# Patient Record
Sex: FEMALE | Race: WHITE | Employment: PART TIME | URBAN - METROPOLITAN AREA
[De-identification: names, ages, dates, MRNs, and addresses within clinical notes are randomized per-mention and may not be internally consistent; named-entity substitution may affect disease eponyms.]

---

## 2021-11-03 NOTE — PROGRESS NOTES
Elisa Ty is a 55 y.o. female is seen on 2021 for Establish Care and Medication Refill    Assessment & Plan:     1. Mild depression ([address]  Assessment & Plan:  Stable on regimen, continue  Interested in weaning down to 20 mg, will let me know when she is ready  Orders:  -     METABOLIC PANEL, COMPREHENSIVE; Future  -     CBC WITH AUTOMATED DIFF; Future  -     TSH 3RD GENERATION; Future  -     FLUoxetine (PROzac) 40 mg capsule; Take 1 Capsule by mouth daily. Indications: major depressive disorder, Normal, Disp-90 Capsule, R-0  2. Screening for lipid disorders  -     LIPID PANEL; Future  3. Need for hepatitis C screening test  -     HEPATITIS C AB; Future  4. Encounter for screening mammogram for malignant neoplasm of breast  -     NEELAM MAMMO BI SCREENING INCL CAD; Future  5. Screen for colon cancer  Comments:  Discussed colonoscopy vs FIT vs Cologuard  Orders:  -     REFERRAL TO GASTROENTEROLOGY  6. Needs flu shot  Comments:  Patient to obtain proof of vaccination  7. Encounter to establish care    Follow-up and Dispositions    · Return in about 4 weeks (around 2021) for physical, lab results. Subjective:     HPI    Previous PCP: Physician from [place]  Reason for switching: relocation    Social Hx:  Occupation-   Household- lives with   ETOH use- 1 drink a week  Recreational drug use- none  Tobacco use- none    Gyn Hx:  Last PAP: 3 years ago, will schedule    Miscarriage: 0  : 0  Date of LMP: n/a  Hx of STDs: none  Birth Control: n/a  Menopause: Yes  Hysterectomy: No  Last mammogram- 3 years ago, will order  Has hx of breast reduction in ?   She states that every time she goes for mammogram, she always has to go back for a second    Family Hx:  HTN- none  Diabetes- none  HLD- none  MI- none  Stroke - none  Cancer- PGF (brain?), father (non-alcoholic fatty liver)  Mental Health Disorder- parents, grandparents (depression)  Autoimmune Disease- none    Preventive:  COVID-19 vac - card scanned today  Flu vaccine- already received at [facility] vaccine[facility] [place]   Tetanus vaccine- unsure  Last Colonoscopy- never   Hep  C screening - ordered  MyChart activation - sent via email    Health Concerns:    Depression-  Patient is seen for followup of depression. Ongoing symptoms include:  see PHQ-9  Denies DANYELL  Has been on this medication for about 30 years  Medication has been working well for her  She is thinking about reducing the dose at some point in time  Key Psychotherapeutic Meds             FLUoxetine (PROzac) 40 mg capsule (Taking) Take 1 Capsule by mouth daily. Indications: major depressive disorder        3 most recent PHQ Screens 11/4/2021   Little interest or pleasure in doing things Not at all   Feeling down, depressed, irritable, or hopeless Several days   Total Score PHQ 2 1   Trouble falling or staying asleep, or sleeping too much Several days   Feeling tired or having little energy Several days   Poor appetite, weight loss, or overeating Nearly every day   Feeling bad about yourself - or that you are a failure or have let yourself or your family down Not at all   Trouble concentrating on things such as school, work, reading, or watching TV Not at all   Moving or speaking so slowly that other people could have noticed; or the opposite being so fidgety that others notice Not at all   Thoughts of being better off dead, or hurting yourself in some way Not at all   PHQ 9 Score 6     Review of Systems   Constitutional: Negative for chills, fever and malaise/fatigue. Respiratory: Negative for shortness of breath. Cardiovascular: Negative for chest pain. Psychiatric/Behavioral: Positive for depression. Negative for suicidal ideas.      Objective:   /81 (BP 1 Location: Left upper arm, BP Patient Position: Sitting, BP Cuff Size: Adult)   Pulse 71   Temp 98.3 °F (36.8 °C) (Oral)   Resp 17   Ht 5' 5\" (1.651 m)   Wt 220 lb (99.8 kg)   SpO2 95%   BMI 36.61 kg/m²     Physical Exam  Vitals and nursing note reviewed. Constitutional:       General: She is not in acute distress. Appearance: She is not ill-appearing. HENT:      Head: Normocephalic and atraumatic. Cardiovascular:      Rate and Rhythm: Normal rate and regular rhythm. Heart sounds: No murmur heard. No friction rub. No gallop. Pulmonary:      Effort: Pulmonary effort is normal. No respiratory distress. Breath sounds: No wheezing, rhonchi or rales. Skin:     General: Skin is warm and dry. Neurological:      General: No focal deficit present. Mental Status: She is alert and oriented to person, place, and time. Psychiatric:         Mood and Affect: Mood normal.         Thought Content:  Thought content normal.         Judgment: Judgment normal.        Nel Bello NP

## 2021-11-04 ENCOUNTER — OFFICE VISIT (OUTPATIENT)
Dept: FAMILY MEDICINE CLINIC | Age: 56
End: 2021-11-04
Payer: COMMERCIAL

## 2021-11-04 VITALS
HEIGHT: 65 IN | BODY MASS INDEX: 36.65 KG/M2 | DIASTOLIC BLOOD PRESSURE: 81 MMHG | HEART RATE: 71 BPM | SYSTOLIC BLOOD PRESSURE: 116 MMHG | OXYGEN SATURATION: 95 % | RESPIRATION RATE: 17 BRPM | WEIGHT: 220 LBS | TEMPERATURE: 98.3 F

## 2021-11-04 DIAGNOSIS — Z12.11 SCREEN FOR COLON CANCER: ICD-10-CM

## 2021-11-04 DIAGNOSIS — Z23 NEEDS FLU SHOT: ICD-10-CM

## 2021-11-04 DIAGNOSIS — Z11.59 NEED FOR HEPATITIS C SCREENING TEST: ICD-10-CM

## 2021-11-04 DIAGNOSIS — F32.A MILD DEPRESSION: Primary | ICD-10-CM

## 2021-11-04 DIAGNOSIS — Z13.220 SCREENING FOR LIPID DISORDERS: ICD-10-CM

## 2021-11-04 DIAGNOSIS — Z76.89 ENCOUNTER TO ESTABLISH CARE: ICD-10-CM

## 2021-11-04 DIAGNOSIS — Z12.31 ENCOUNTER FOR SCREENING MAMMOGRAM FOR MALIGNANT NEOPLASM OF BREAST: ICD-10-CM

## 2021-11-04 PROCEDURE — 99204 OFFICE O/P NEW MOD 45 MIN: CPT | Performed by: NURSE PRACTITIONER

## 2021-11-04 RX ORDER — FLUOXETINE HYDROCHLORIDE 40 MG/1
40 CAPSULE ORAL DAILY
COMMUNITY
End: 2021-11-04 | Stop reason: SDUPTHER

## 2021-11-04 RX ORDER — FLUOXETINE HYDROCHLORIDE 40 MG/1
40 CAPSULE ORAL DAILY
Qty: 90 CAPSULE | Refills: 0 | Status: SHIPPED | OUTPATIENT
Start: 2021-11-04 | End: 2022-01-10

## 2021-11-04 NOTE — ASSESSMENT & PLAN NOTE
Stable on regimen, continue  Interested in weaning down to 20 mg, will let me know when she is ready

## 2021-11-04 NOTE — PROGRESS NOTES
Magui Ramirez presents today for   Chief Complaint   Patient presents with   [facility] Care    Medication Refill       Is someone accompanying this pt? No     Is the patient using any DME equipment during [address]? no    Depression Screening:  3 most recent PHQ Screens 11/4/2021   Little interest or pleasure in doing things Not at all   Feeling down, depressed, irritable, or hopeless Several days   Total Score PHQ 2 1   Trouble falling or staying asleep, or sleeping too much Several days   Feeling tired or having little energy Several days   Poor appetite, weight loss, or overeating Nearly every day   Feeling bad about yourself - or that you are a failure or have let yourself or your family down Not at all   Trouble concentrating on things such as school, work, reading, or watching TV Not at all   Moving or speaking so slowly that other people could have noticed; or the opposite being so fidgety that others notice Not at all   Thoughts of being better off dead, or hurting yourself in some way Not at all   PHQ 9 Score 6       Learning Assessment:  Learning Assessment 11/4/2021   PRIMARY LEARNER Patient   HIGHEST LEVEL OF EDUCATION - PRIMARY LEARNER  4 YEARS [address] CAREGIVER No   PRIMARY LANGUAGE ENGLISH   LEARNER PREFERENCE PRIMARY READING   ANSWERED BY patient    RELATIONSHIP SELF       Fall Risk  No flowsheet data found. ADL  No flowsheet data found. Travel Screening:    Travel Screening     Question   Response    In the last month, have you been in contact with someone who was confirmed or suspected to have Coronavirus / COVID-19? No / Unsure    Have you had a COVID-19 viral test in the last 14 days? No    Do you have any of the following new or worsening symptoms? Have you traveled internationally or domestically in the last month?   No      Travel History   Travel since 10/04/21    No documented travel since 10/04/21         [facility] Maintenance reviewed and discussed and ordered per Provider. Health Maintenance Due   Topic Date Due    Hepatitis C Screening  Never done    COVID-19 Vaccine (1) Never done    DTaP/Tdap/Td series (1 - Tdap) Never done    Cervical cancer screen  Never done    Lipid Screen  Never done    Colorectal Cancer Screening Combo  Never done    Shingrix Vaccine Age 50> (1 of 2) Never done    Breast Cancer Screen Mammogram  Never done    Flu Vaccine (1) Never done   . Coordination of Care:  1. Have you been to the ER, urgent care clinic since your last visit? Hospitalized since your last visit? no    2. Have you seen or consulted any other health care providers outside of the [address] [place] [address] since your last visit? Include any pap smears or colon screening.  no

## 2021-11-04 NOTE — PATIENT INSTRUCTIONS
Recovering From Depression: Care Instructions  Your Care Instructions     Taking good care of yourself is important as you recover from depression. In time, your symptoms will fade as your treatment takes hold. Do not give up. Instead, focus your energy on getting better. Your mood will improve. It just takes some time. Focus on things that can help you feel better, such as being with friends and family, eating well, and getting enough rest. But take things slowly. Do not do too much too soon. You will begin to feel better gradually. Follow-up care is a key part of your treatment and safety. Be sure to make and go to all appointments, and call your doctor if you are having problems. It's also a good idea to know your test results and keep a list of the medicines you take. How can you care for yourself at home? Be realistic  · If you have a large task to do, break it up into smaller steps you can handle, and just do what you can. · You may want to put off important decisions until your depression has lifted. If you have plans that will have a major impact on your life, such as marriage, divorce, or a job change, try to wait a bit. Talk it over with friends and loved ones who can help you look at the overall picture first.  · Reaching out to people for help is important. Do not isolate yourself. Let your family and friends help you. Find someone you can trust and confide in, and talk to that person. · Be patient, and be kind to yourself. Remember that depression is not your fault and is not something you can overcome with willpower alone. Treatment is important for depression, just like for any other illness. Feeling better takes time, and your mood will improve little by little. Stay active  · Stay busy and get outside. Take a walk, or try some other light exercise. · Talk with your doctor about an exercise program. Exercise can help with mild depression. · Go to a movie or concert.  Take part in a Worship activity or other social gathering. Go to a [employer] game. · Ask a friend to have dinner with you. Take care of yourself  · Eat a balanced diet with plenty of fresh fruits and vegetables, whole grains, and lean protein. If you have lost your appetite, eat small snacks rather than large meals. · Avoid using illegal drugs or marijuana and drinking alcohol. Do not take medicines that have not been prescribed for you. They may interfere with medicines you may be taking for depression, or they may make your depression worse. · Take your medicines exactly as they are prescribed. You may start to feel better within 1 to 3 weeks of taking antidepressant medicine. But it can take as many as 6 to 8 weeks to see more improvement. If you have questions or concerns about your medicines, or if you do not notice any improvement by 3 weeks, talk to your doctor. · Continue to take your medicine after your symptoms improve. Taking your medicine for at least 6 months after you feel better can help keep you from getting depressed again. If this isn't the first time you have been depressed, your doctor may recommend you to take medicine even longer. · If you have any side effects from your medicine, tell your doctor. Many side effects are mild and will go away on their own after you have been taking the medicine for a few weeks. Some may last longer. Talk to your doctor if side effects are bothering you too much. You might be able to try a different medicine. · Continue counseling. It may help prevent depression from returning, especially if you've had multiple episodes of depression. Talk with your counselor if you are having a hard time attending your sessions or you think the sessions aren't working. Don't just stop going. · Get enough sleep. Talk to your doctor if you are having problems sleeping. · Avoid sleeping pills unless they are prescribed by the doctor treating your depression.  Sleeping pills may make you groggy during the day, and they may interact with other medicine you are taking. · If you have any other illnesses, such as diabetes, heart disease, or high blood pressure, make sure to continue with your treatment. Tell your doctor about all of the medicines you take, including those with or without a prescription. · If you or someone you know talks about suicide, self-harm, or feeling hopeless, get help right away. Call the [address] [place] [address] at 1-800-273-talk (0-618.926.7898) or text HOME to 340246 to access the Crisis Text Line. Consider saving these numbers in your phone. When should you call for help? Call 911 anytime you think you may need emergency care. For example, call if:    · You feel like hurting yourself or someone else.     · Someone you know has depression and is about to attempt or is attempting suicide. Call your doctor now or seek immediate medical care if:    · You hear voices.     · Someone you know has depression and:  ? Starts to give away his or her possessions. ? Uses illegal drugs or drinks alcohol heavily. ? Talks or writes about death, including writing suicide notes or talking about guns, knives, or pills. ? Starts to spend a lot of time alone. ? Acts very aggressively or suddenly appears calm. Watch closely for changes in your health, and be sure to contact your doctor if:    · You do not get better as expected. Where can you learn more? Go to http://www.gray.com/  Enter N529 in the search box to learn more about \"Recovering From Depression: Care Instructions. \"  Current as of: June 16, 2021               Content Version: 13.0  © 1966-9654 [employer] Incorporated. Care instructions adapted under license by [employer] (which disclaims liability or warranty for this information).  If you have questions about a medical condition or this instruction, always ask your healthcare professional. [address] any warranty or liability for your use of this information.

## 2021-11-29 ENCOUNTER — HOSPITAL ENCOUNTER (OUTPATIENT)
Dept: MAMMOGRAPHY | Age: 56
Discharge: HOME | End: 2021-11-29
Attending: NURSE PRACTITIONER
Payer: COMMERCIAL

## 2021-11-29 DIAGNOSIS — Z12.31 ENCOUNTER FOR SCREENING MAMMOGRAM FOR MALIGNANT NEOPLASM OF BREAST: ICD-10-CM

## 2021-11-29 PROCEDURE — 77063 BREAST TOMOSYNTHESIS BI: CPT

## 2021-12-02 ENCOUNTER — HOSPITAL ENCOUNTER (OUTPATIENT)
Dept: LAB | Age: 56
Discharge: HOME | End: 2021-12-02

## 2021-12-02 LAB — XX-LABCORP SPECIMEN COL,LCBCF: NORMAL

## 2021-12-02 PROCEDURE — 99001 SPECIMEN HANDLING PT-LAB: CPT

## 2021-12-03 LAB
ALBUMIN SERPL-MCNC: 4.4 G/DL (ref 3.8–4.9)
ALBUMIN/GLOB SERPL: 1.7 {RATIO} (ref 1.2–2.2)
ALP SERPL-CCNC: 72 IU/L (ref 44–121)
ALT SERPL-CCNC: 23 IU/L (ref 0–32)
AST SERPL-CCNC: 24 IU/L (ref 0–40)
BASOPHILS # BLD AUTO: 0.1 X10E3/UL (ref 0–0.2)
BASOPHILS NFR BLD AUTO: 1 %
BILIRUB SERPL-MCNC: 0.8 MG/DL (ref 0–1.2)
BUN SERPL-MCNC: 14 MG/DL (ref 6–24)
BUN/CREAT SERPL: 11 (ref 9–23)
CALCIUM SERPL-MCNC: 9.4 MG/DL (ref 8.7–10.2)
CHLORIDE SERPL-SCNC: 105 MMOL/L (ref 96–106)
CHOLEST SERPL-MCNC: 204 MG/DL (ref 100–199)
CO2 SERPL-SCNC: 22 MMOL/L (ref 20–29)
CREAT SERPL-MCNC: 1.3 MG/DL (ref 0.57–1)
EOSINOPHIL # BLD AUTO: 0.1 X10E3/UL (ref 0–0.4)
EOSINOPHIL NFR BLD AUTO: 2 %
ERYTHROCYTE [DISTWIDTH] IN BLOOD BY AUTOMATED COUNT: 12.5 % (ref 11.7–15.4)
GLOBULIN SER CALC-MCNC: 2.6 G/DL (ref 1.5–4.5)
GLUCOSE SERPL-MCNC: 103 MG/DL (ref 65–99)
HCT VFR BLD AUTO: 42.8 % (ref 34–46.6)
HCV AB S/CO SERPL IA: <0.1 S/CO RATIO (ref 0–0.9)
HDLC SERPL-MCNC: 39 MG/DL
HGB BLD-MCNC: 14.2 G/DL (ref 11.1–15.9)
IMM GRANULOCYTES # BLD AUTO: 0 X10E3/UL (ref 0–0.1)
IMM GRANULOCYTES NFR BLD AUTO: 0 %
IMP & REVIEW OF LAB RESULTS: NORMAL
INTERPRETATION: NORMAL
LDLC SERPL CALC-MCNC: 140 MG/DL (ref 0–99)
LYMPHOCYTES # BLD AUTO: 2.1 X10E3/UL (ref 0.7–3.1)
LYMPHOCYTES NFR BLD AUTO: 34 %
MCH RBC QN AUTO: 30.3 PG (ref 26.6–33)
MCHC RBC AUTO-ENTMCNC: 33.2 G/DL (ref 31.5–35.7)
MCV RBC AUTO: 91 FL (ref 79–97)
MONOCYTES # BLD AUTO: 0.5 X10E3/UL (ref 0.1–0.9)
MONOCYTES NFR BLD AUTO: 8 %
NEUTROPHILS # BLD AUTO: 3.3 X10E3/UL (ref 1.4–7)
NEUTROPHILS NFR BLD AUTO: 55 %
PLATELET # BLD AUTO: 259 X10E3/UL (ref 150–450)
POTASSIUM SERPL-SCNC: 4.4 MMOL/L (ref 3.5–5.2)
PROT SERPL-MCNC: 7 G/DL (ref 6–8.5)
RBC # BLD AUTO: 4.69 X10E6/UL (ref 3.77–5.28)
SODIUM SERPL-SCNC: 141 MMOL/L (ref 134–144)
TRIGL SERPL-MCNC: 136 MG/DL (ref 0–149)
TSH SERPL DL<=0.005 MIU/L-ACNC: 2.72 UIU/ML (ref 0.45–4.5)
VLDLC SERPL CALC-MCNC: 25 MG/DL (ref 5–40)
WBC # BLD AUTO: 6.2 X10E3/UL (ref 3.4–10.8)

## 2021-12-10 PROBLEM — R73.01 IFG (IMPAIRED FASTING GLUCOSE): Status: ACTIVE | Noted: 2021-12-10

## 2021-12-10 PROBLEM — R79.89 ELEVATED SERUM CREATININE: Status: ACTIVE | Noted: 2021-12-10

## 2021-12-10 PROBLEM — E78.5 HYPERLIPIDEMIA LDL GOAL <100: Status: ACTIVE | Noted: 2021-12-10

## 2021-12-10 PROBLEM — Z00.00 ANNUAL PHYSICAL EXAM: Status: ACTIVE | Noted: 2021-12-10

## 2021-12-10 NOTE — ASSESSMENT & PLAN NOTE
Physical activity for a total of 150 minutes per week is recommended, drink plenty of water. Reduce CHO intake, such as white pastas, white rice, white breads. Avoid fried foods, and eat more green, leafy vegetables, whole grains, and lean proteins.   Discussed recommended screenings and vaccines

## 2021-12-10 NOTE — ASSESSMENT & PLAN NOTE
LDL elevated, diet and exercise emphasized, handout given  Recheck in 3 mos  The 10-year ASCVD risk score (Soumya Kahn, et al., 2013) is: 2.1%

## 2021-12-10 NOTE — PROGRESS NOTES
Otis Pascual is a 55 y.o. female is seen on 12/13/2021 for Physical and Labs    Assessment & Plan:     1. Annual physical exam  Assessment & Plan:  Physical activity for a total of 150 minutes per week is recommended, drink plenty of water. Reduce CHO intake, such as white pastas, white rice, white breads. Avoid fried foods, and eat more green, leafy vegetables, whole grains, and lean proteins. Discussed recommended screenings and vaccines  2. Obesity (BMI 30-39. 9)  Assessment & Plan:  Diet and exercise advised  3. Hyperlipidemia LDL goal <100  Assessment & Plan:  LDL elevated, diet and exercise emphasized, handout given  Recheck in 3 mos  The 10-year ASCVD risk score (Arabella Hernandez, et al., 2013) is: 2.1%    Orders:  -     LIPID PANEL; Future  -     METABOLIC PANEL, COMPREHENSIVE; Future  4. IFG (impaired fasting glucose)  Assessment & Plan:  Diet and exercise emphasized  Add A1c to next set of labs    Orders:  -     METABOLIC PANEL, COMPREHENSIVE; Future  -     HEMOGLOBIN A1C WITH EAG; Future  5. Elevated serum creatinine  Assessment & Plan:  Avoid NSAIDs, hydration advised  Trend for now, repeat in 3 mos  Orders:  -     METABOLIC PANEL, COMPREHENSIVE; Future  6. Mild depression ([address]  Assessment & Plan:  Stable on regimen, continue  7. Encounter for immunization  -     TETANUS, DIPHTHERIA TOXOIDS AND ACELLULAR PERTUSSIS VACCINE (TDAP), IN INDIVIDS. >=7, IM  8.  Encounter to discuss test results     Follow-up and Dispositions    · Return in about 4 weeks (around 1/10/2022) for PAP AND  · Return in 3 mos for depression, cholesterol, elevated fasting blood sugar, lab results       Subjective:     HPI    Social Hx:  Occupation-   Household- lives with   ETOH use- 1 drink a week  Recreational drug use- none  Tobacco use- none    Family Hx:  HTN- none  Diabetes- none  HLD- none  MI- none  Stroke - none  Cancer- PGF (brain?), father (non-alcoholic fatty liver)  Mental Health Disorder- parents, grandparents (depression)  Autoimmune Disease- none    Preventive:  Diet: admits to poor eating d/t things happening at home (father  this past May and she moved)  Exercise: None, plans to obtain gym membership  Last eye exam- 1.5 years ago, will schedule  Last dental exam- 1.5 years ago, will schedule  Last PAP- 3 years ago, will schedule  COVID-19 vac - card scanned today  Flu vaccine- [facility] received at [facility]   [facility] [place]   Tetanus vaccine- unsure, will give today  Last Colonoscopy- previously referred, advised to schedule    Health Concerns:    Headaches/Migraines-  Onset: when she started menopause about 3 years ago  Location: back of the head  Duration: hours to 3 days  Characteristics: throbbing, pounding  Frequency: 1 in the last 2 mos  Associated symptoms: none  Aggravating factors: none  Relieving factors: medications  Treatment: Sumatriptan 50 mg, excedrin migraine  Pain Rating: none    Review of Systems   Constitutional: Negative for chills, fever, malaise/fatigue and weight loss. HENT: Negative for congestion, ear pain and sore throat. Eyes: Negative for blurred vision, pain and discharge. Respiratory: Negative for cough and shortness of breath. Cardiovascular: Negative for chest pain, palpitations, orthopnea and leg swelling. Gastrointestinal: Negative for abdominal pain, blood in stool, diarrhea, nausea and vomiting. Genitourinary: Negative for dysuria, frequency, hematuria and urgency. Musculoskeletal: Negative for back pain, joint pain and myalgias. Skin: Negative for rash. Neurological: Negative for dizziness, tingling, weakness and headaches. Endo/Heme/Allergies: Does not bruise/bleed easily. Psychiatric/Behavioral: Negative for depression. The patient is not nervous/anxious and does not have insomnia.       Objective:   /77 (BP 1 Location: Left upper arm, BP Patient Position: Sitting, BP Cuff Size: Adult)   Pulse (!) 106   Temp 98.2 °F (36.8 °C) (Oral)   Resp 18   Ht 5' 5\" (1.651 m)   Wt 218 lb (98.9 kg)   SpO2 98%   BMI 36.28 kg/m²    Visual Acuity Screening    Right eye Left eye Both eyes   Without correction:      With correction: 20/25 20/20 20/15       Physical Exam  Vitals and nursing note reviewed. Constitutional:       General: She is not in acute distress. Appearance: Normal appearance. She is not ill-appearing or toxic-appearing. HENT:      Head: Normocephalic and atraumatic. Right Ear: Tympanic membrane, ear canal and external ear normal.      Left Ear: Tympanic membrane, ear canal and external ear normal.      Mouth/Throat:      Mouth: Mucous membranes are moist.      Pharynx: Oropharynx is clear. No oropharyngeal exudate or posterior oropharyngeal erythema. Eyes:      Extraocular Movements: Extraocular movements intact. Conjunctiva/sclera: Conjunctivae normal.      Pupils: Pupils are equal, round, and reactive to light. Neck:      Vascular: No carotid bruit. Cardiovascular:      Rate and Rhythm: Regular rhythm. Tachycardia present. Pulses: Normal pulses. Heart sounds: Normal heart sounds. No murmur heard. No friction rub. No gallop. Pulmonary:      Effort: Pulmonary effort is normal. No respiratory distress. Breath sounds: No wheezing, rhonchi or rales. Abdominal:      General: Abdomen is flat. Bowel sounds are normal. There is no distension. Palpations: Abdomen is soft. There is no mass. Tenderness: There is no abdominal tenderness. There is no right CVA tenderness, left CVA tenderness, guarding or rebound. Musculoskeletal:         General: No tenderness or deformity. Normal range of motion. Cervical back: Normal range of motion and neck supple. Lymphadenopathy:      Cervical: No cervical adenopathy. Skin:     General: Skin is warm and dry. Capillary Refill: Capillary refill takes less than 2 seconds. Neurological:      General: No focal deficit present. Mental Status: She is alert and oriented to person, place, and time. Sensory: No sensory deficit. Motor: No weakness. Coordination: Coordination normal.   Psychiatric:         Mood and Affect: Mood normal.         Thought Content:  Thought content normal.         Judgment: Judgment normal.        CHUY Estrada-C

## 2021-12-13 ENCOUNTER — OFFICE VISIT (OUTPATIENT)
Dept: FAMILY MEDICINE CLINIC | Age: 56
End: 2021-12-13
Payer: COMMERCIAL

## 2021-12-13 VITALS
BODY MASS INDEX: 36.32 KG/M2 | WEIGHT: 218 LBS | SYSTOLIC BLOOD PRESSURE: 109 MMHG | TEMPERATURE: 98.2 F | HEIGHT: 65 IN | RESPIRATION RATE: 18 BRPM | OXYGEN SATURATION: 98 % | HEART RATE: 106 BPM | DIASTOLIC BLOOD PRESSURE: 77 MMHG

## 2021-12-13 DIAGNOSIS — E78.5 HYPERLIPIDEMIA LDL GOAL <100: ICD-10-CM

## 2021-12-13 DIAGNOSIS — F32.A MILD DEPRESSION: ICD-10-CM

## 2021-12-13 DIAGNOSIS — Z71.2 ENCOUNTER TO DISCUSS TEST RESULTS: ICD-10-CM

## 2021-12-13 DIAGNOSIS — Z23 ENCOUNTER FOR IMMUNIZATION: ICD-10-CM

## 2021-12-13 DIAGNOSIS — R79.89 ELEVATED SERUM CREATININE: ICD-10-CM

## 2021-12-13 DIAGNOSIS — R73.01 IFG (IMPAIRED FASTING GLUCOSE): ICD-10-CM

## 2021-12-13 DIAGNOSIS — E66.9 OBESITY (BMI 30-39.9): ICD-10-CM

## 2021-12-13 DIAGNOSIS — Z00.00 ANNUAL PHYSICAL EXAM: Primary | ICD-10-CM

## 2021-12-13 PROCEDURE — 99396 PREV VISIT EST AGE 40-64: CPT | Performed by: NURSE PRACTITIONER

## 2021-12-13 PROCEDURE — 90471 IMMUNIZATION ADMIN: CPT | Performed by: NURSE PRACTITIONER

## 2021-12-13 PROCEDURE — 90715 TDAP VACCINE 7 YRS/> IM: CPT | Performed by: NURSE PRACTITIONER

## 2021-12-13 RX ORDER — SUMATRIPTAN 50 MG/1
50 TABLET, FILM COATED ORAL
COMMUNITY
End: 2022-03-10 | Stop reason: SDUPTHER

## 2021-12-13 NOTE — PATIENT INSTRUCTIONS

## 2021-12-13 NOTE — PROGRESS NOTES
Tiffanie Bean presents today for   Chief Complaint   Patient presents with    Physical    Labs       Is someone accompanying this pt? No     Is the patient using any DME equipment during OV? No     Depression Screening:  3 most recent PHQ Screens 12/13/2021   Little interest or pleasure in doing things Not at all   Feeling down, depressed, irritable, or hopeless Not at all   Total Score PHQ 2 0   Trouble falling or staying asleep, or sleeping too much -   Feeling tired or having little energy -   Poor appetite, weight loss, or overeating -   Feeling bad about yourself - or that you are a failure or have let yourself or your family down -   Trouble concentrating on things such as school, work, reading, or watching TV -   Moving or speaking so slowly that other people could have noticed; or the opposite being so fidgety that others notice -   Thoughts of being better off dead, or hurting yourself in some way -   PHQ 9 Score -       Learning Assessment:  Learning Assessment 11/4/2021   PRIMARY LEARNER Patient   HIGHEST LEVEL OF EDUCATION - PRIMARY LEARNER  4 YEARS OF COLLEGE   BARRIERS PRIMARY LEARNER NONE   CO-LEARNER CAREGIVER No   PRIMARY LANGUAGE ENGLISH   LEARNER PREFERENCE PRIMARY READING   ANSWERED BY patient    RELATIONSHIP SELF       Fall Risk  No flowsheet data found. ADL  No flowsheet data found. Travel Screening:    Travel Screening     Question   Response    In the last month, have you been in contact with someone who was confirmed or suspected to have Coronavirus / COVID-19? No / Unsure    Have you had a COVID-19 viral test in the last 14 days? No    Do you have any of the following new or worsening symptoms? Have you traveled internationally or domestically in the last month? No      Travel History   Travel since 11/13/21    No documented travel since 11/13/21         Health Maintenance reviewed and discussed and ordered per Provider.     Health Maintenance Due   Topic Date Due    A1C test (Diabetic or Prediabetic)  Never done    DTaP/Tdap/Td series (1 - Tdap) Never done    Cervical cancer screen  Never done    Colorectal Cancer Screening Combo  Never done    Shingrix Vaccine Age 50> (1 of 2) Never done    Flu Vaccine (1) Never done   . Coordination of Care:  1. Have you been to the ER, urgent care clinic since your last visit? Hospitalized since your last visit? No     2. Have you seen or consulted any other health care providers outside of the [address] [place] [address] since your last visit? Include any pap smears or colon screening.  No

## 2021-12-13 NOTE — PROGRESS NOTES
Obtained consent from patient. Per verbal order from NP Vasiliy Injection of TDAP administered. Verified by me that this is the correct immunization/injection. Patient observed for 15 minutes with no adverse reaction.

## 2022-01-07 NOTE — PROGRESS NOTES
Assessment & Plan:     1. Well woman exam  -     PAP IG, APTIMA HPV AND RFX 16/18,45 ([id number]); Future  2. Pap smear for cervical cancer screening  -     PAP IG, APTIMA HPV AND RFX 16/18,45 ([id number]); Future     Follow-up and Dispositions    · Return in about 2 months (around 3/10/2022) for depression, cholesterol, elevated fasting blood sugar, lab results. Subjective:     HPI    OB/Gyn Hx  Last PAP: 3 yerars ago  Hx of abnormal PAP: None  STDs:  no  GP2  Date of LMP: n/a  Birth Control: n/a  Hysterectomy: no  Family hx of breast/cervical/uterine/ovarian cancer: None  Breast complaints: none  Vaginal symptoms: none    Health Maintenance:  Flu vac - received at . Pck 125 - received at [facility]  Colonoscopy -  previously referred, advised to schedule    Review of Systems:    Constitutional: Negative. Skin: Negative. Respiratory: Negative for cough and shortness of breath. Breast: Negative for breast pain, breast mass, swelling, nipple pain and nipple discharge  Cardiovascular:  Negative for leg swelling and palpitations. Genitourinary: Negative for dysuria, frequency, vaginal bleeding, vaginal discharge, vaginal pain, vaginal burning, vaginal itching, vaginal odor and vaginal lesion. Endocrine: Negative for cold intolerance and heat intolerance. Objective:   /72 (BP 1 Location: Left upper arm, BP Patient Position: Sitting, BP Cuff Size: Adult)   Pulse 88   Temp 97.7 °F (36.5 °C) (Oral)   Resp 16   Ht 5' 5\" (1.651 m)   Wt 223 lb (101.2 kg)   SpO2 96%   BMI 37.11 kg/m²      Physical Exam  Vitals signs and nursing note reviewed. Exam conducted with a chaperone present. Constitutional:       Appearance: Normal appearance. HENT:      Head: Normocephalic and atraumatic. Neck:      Musculoskeletal: Normal range of motion and neck supple. Cardiovascular:      Rate and Rhythm: Normal rate and regular rhythm. Pulses: Normal pulses. Heart sounds: No murmur. No friction rub. No gallop. Pulmonary:      Effort: Pulmonary effort is normal. No respiratory distress. Breath sounds: No wheezing, rhonchi or rales. Abdominal:      General: Bowel sounds are normal. There is no distension. Palpations: Abdomen is soft. There is no mass. Tenderness: There is no abdominal tenderness. There is no right CVA tenderness, left CVA tenderness, guarding or rebound. Musculoskeletal: Normal range of motion. General: No deformity. Lymphadenopathy:      Cervical: No cervical adenopathy. Skin:     General: Skin is warm and dry. Neurological:      Mental Status: She is alert and oriented to person, place, and time. Psychiatric:         Mood and Affect: Mood normal.         Behavior: Behavior normal.     Breasts: breasts appear normal, no suspicious masses, no skin or nipple changes or axillary nodes.   Pelvic exam:   VULVA: normal appearing vulva with no masses, tenderness or lesions,   VAGINA: normal appearing vagina with normal color and discharge, no lesions,   CERVIX: normal appearing cervix without discharge or lesions,   UTERUS: uterus is normal size, shape, consistency and nontender,   ADNEXA: normal adnexa in size, nontender and no masses,   RECTAL: normal rectal, no masses,   Exam chaperoned by Vianca Holland, [address] [place] [address], [facility]

## 2022-01-10 ENCOUNTER — HOSPITAL ENCOUNTER (OUTPATIENT)
Dept: LAB | Age: 57
Discharge: HOME | End: 2022-01-10
Payer: COMMERCIAL

## 2022-01-10 ENCOUNTER — OFFICE VISIT (OUTPATIENT)
Dept: FAMILY MEDICINE CLINIC | Age: 57
End: 2022-01-10
Payer: COMMERCIAL

## 2022-01-10 VITALS
RESPIRATION RATE: 16 BRPM | HEART RATE: 88 BPM | OXYGEN SATURATION: 96 % | WEIGHT: 223 LBS | SYSTOLIC BLOOD PRESSURE: 103 MMHG | DIASTOLIC BLOOD PRESSURE: 72 MMHG | HEIGHT: 65 IN | TEMPERATURE: 97.7 F | BODY MASS INDEX: 37.15 KG/M2

## 2022-01-10 DIAGNOSIS — F32.A MILD DEPRESSION: ICD-10-CM

## 2022-01-10 DIAGNOSIS — Z01.419 WELL WOMAN EXAM: Primary | ICD-10-CM

## 2022-01-10 DIAGNOSIS — Z12.4 PAP SMEAR FOR CERVICAL CANCER SCREENING: ICD-10-CM

## 2022-01-10 PROCEDURE — 99396 PREV VISIT EST AGE 40-64: CPT | Performed by: NURSE PRACTITIONER

## 2022-01-10 PROCEDURE — 87624 HPV HI-RISK TYP POOLED RSLT: CPT

## 2022-01-10 PROCEDURE — 88175 CYTOPATH C/V AUTO FLUID REDO: CPT

## 2022-01-10 RX ORDER — FLUOXETINE HYDROCHLORIDE 40 MG/1
CAPSULE ORAL
Qty: 90 CAPSULE | Refills: 3 | Status: SHIPPED | OUTPATIENT
Start: 2022-01-10 | End: 2022-03-10 | Stop reason: DRUGHIGH

## 2022-01-10 NOTE — PATIENT INSTRUCTIONS
Pap Test: Care Instructions  Overview     The Pap test (also called a Pap smear) is a screening test for cancer of the cervix, which is the lower part of the uterus that opens into the vagina. The test can help your doctor find early changes in the cells that could lead to cancer. The sample of cells taken during your test has been sent to a lab so that an expert can look at the cells. It usually takes a week or two to get the results back. Follow-up care is a key part of your treatment and safety. Be sure to make and go to all appointments, and call your doctor if you are having problems. It's also a good idea to know your test results and keep a list of the medicines you take. What do the results mean? · A normal result means that the test did not find any abnormal cells in the sample. · An abnormal result can mean many things. Most of these are not cancer. The results of your test may be abnormal because:  ? You have an infection of the vagina or cervix, such as a yeast infection. ? You have an IUD (intrauterine device for birth control). ? You have low estrogen levels after menopause that are causing the cells to change. ? You have cell changes that may be a sign of precancer or cancer. The results are ranked based on how serious the changes might be. There are many other reasons why you might not get a normal result. If the results were abnormal, you may need to get another test within a few weeks or months. If the results show changes that could be a sign of cancer, you may need a test called a colposcopy, which provides a more complete view of the cervix. Sometimes the lab cannot use the sample because it does not contain enough cells or was not preserved well. If so, you may need to have the test again. This is not common, but it does happen from time to time. When should you call for help?   Watch closely for changes in your health, and be sure to contact your doctor if:    · You have vaginal bleeding or pain for more than 2 days after the test. It is normal to have a small amount of bleeding for a day or two after the test.   Where can you learn more? Go to http://www.gray.com/  Enter L452 in the search box to learn more about \"Pap Test: Care Instructions. \"  Current as of: December 17, 2020               Content Version: 13.0  © 2420-5687 [employer]. Care instructions adapted under license by [employer] (which disclaims liability or warranty for this information). If you have questions about a medical condition or this instruction, always ask your healthcare professional. [place] [address] any warranty or liability for your use of this information.

## 2022-01-10 NOTE — PROGRESS NOTES
Nabil Rogers presents today for   Chief Complaint   Patient presents with    Well Woman       Is someone accompanying this pt? no    Is the patient using any DME equipment during OV? no    Depression Screening:  3 most recent PHQ Screens 12/13/2021   Little interest or pleasure in doing things Not at all   Feeling down, depressed, irritable, or hopeless Not at all   Total Score PHQ 2 0   Trouble falling or staying asleep, or sleeping too much -   Feeling tired or having little energy -   Poor appetite, weight loss, or overeating -   Feeling bad about yourself - or that you are a failure or have let yourself or your family down -   Trouble concentrating on things such as school, work, reading, or watching TV -   Moving or speaking so slowly that other people could have noticed; or the opposite being so fidgety that others notice -   Thoughts of being better off dead, or hurting yourself in some way -   PHQ 9 Score -       Learning Assessment:  Learning Assessment 1/10/2022   PRIMARY LEARNER Patient   HIGHEST LEVEL OF EDUCATION - PRIMARY LEARNER  4 YEARS OF COLLEGE   BARRIERS PRIMARY LEARNER NONE   CO-LEARNER CAREGIVER No   PRIMARY LANGUAGE ENGLISH   LEARNER PREFERENCE PRIMARY READING   ANSWERED BY patient    RELATIONSHIP SELF       Fall Risk  No flowsheet data found. ADL  No flowsheet data found. Travel Screening:    Travel Screening     Question   Response    In the last month, have you been in contact with someone who was confirmed or suspected to have Coronavirus / COVID-19? No / Unsure    Have you had a COVID-19 viral test in the last 14 days? No    Do you have any of the following new or worsening symptoms? Have you traveled internationally or domestically in the last month? No      Travel History   Travel since 12/10/21    No documented travel since 12/10/21         Health Maintenance reviewed and discussed and ordered per Provider.     Health Maintenance Due   Topic Date Due    A1C test (Diabetic or Prediabetic)  Never done    Cervical cancer screen  Never done    Colorectal Cancer Screening Combo  Never done    Shingrix Vaccine Age 50> (1 of 2) Never done    Flu Vaccine (1) Never done   . Coordination of Care:  1. Have you been to the ER, urgent care clinic since your last visit? Hospitalized since your last visit? no    2. Have you seen or consulted any other health care providers outside of the [address] [place] [address] since your last visit? Include any pap smears or colon screening.  no

## 2022-01-18 ENCOUNTER — TELEPHONE (OUTPATIENT)
Dept: FAMILY MEDICINE CLINIC | Age: 57
End: 2022-01-18

## 2022-01-18 NOTE — TELEPHONE ENCOUNTER
LVM for patient to call office back to get PAP results     ----- Message from Yandy Spangler NP sent at 1/18/2022  8:49 AM EST -----  Please call and notify patient that her PAP was normal and we will repeat in 5 years. Thank you.

## 2022-02-11 RX ORDER — SUMATRIPTAN 50 MG/1
50 TABLET, FILM COATED ORAL
Qty: 9 TABLET | Refills: 0 | OUTPATIENT
Start: 2022-02-11

## 2022-02-11 NOTE — TELEPHONE ENCOUNTER
Refill request.  Takes very infrequently. Next appt 3/10/22    Requested Prescriptions     Pending Prescriptions Disp Refills    SUMAtriptan (IMITREX) 50 mg tablet 10 Tablet 1     Sig: Take 1 Tablet by mouth once as needed for Migraine for up to 1 dose.

## 2022-02-11 NOTE — TELEPHONE ENCOUNTER
Patient has never been seen for migraines. She will need to schedule a follow up to discuss condition and appropriateness of medication. Thank you.

## 2022-02-22 ENCOUNTER — PATIENT MESSAGE (OUTPATIENT)
Dept: FAMILY MEDICINE CLINIC | Age: 57
End: 2022-02-22

## 2022-02-22 DIAGNOSIS — Z86.16 HISTORY OF COVID-19: ICD-10-CM

## 2022-02-24 PROBLEM — Z86.16 HISTORY OF COVID-19: Status: ACTIVE | Noted: 2022-02-24

## 2022-02-24 NOTE — TELEPHONE ENCOUNTER
From: Camilla Anglin  [facility]  Sent: 2/22/2022 2:21 PM EST  Subject: Covid Test Positive    I tested positive for Covid on February 16, 2022 using an at-home test. Jason Prost been quarantined, not having any alarming symptoms, dont need to come in or schedule a tele-visit, but wondered if the positive test results should be entered on my chart.

## 2022-03-04 ENCOUNTER — HOSPITAL ENCOUNTER (OUTPATIENT)
Dept: LAB | Age: 57
Discharge: HOME | End: 2022-03-04

## 2022-03-04 LAB — XX-LABCORP SPECIMEN COL,LCBCF: NORMAL

## 2022-03-04 PROCEDURE — 99001 SPECIMEN HANDLING PT-LAB: CPT

## 2022-03-05 LAB
ALBUMIN SERPL-MCNC: 4.4 G/DL (ref 3.8–4.9)
ALBUMIN/GLOB SERPL: 1.4 {RATIO} (ref 1.2–2.2)
ALP SERPL-CCNC: 61 IU/L (ref 44–121)
ALT SERPL-CCNC: 24 IU/L (ref 0–32)
AST SERPL-CCNC: 29 IU/L (ref 0–40)
BILIRUB SERPL-MCNC: 0.9 MG/DL (ref 0–1.2)
BUN SERPL-MCNC: 10 MG/DL (ref 6–24)
BUN/CREAT SERPL: 9 (ref 9–23)
CALCIUM SERPL-MCNC: 9.5 MG/DL (ref 8.7–10.2)
CHLORIDE SERPL-SCNC: 104 MMOL/L (ref 96–106)
CHOLEST SERPL-MCNC: 162 MG/DL (ref 100–199)
CO2 SERPL-SCNC: 21 MMOL/L (ref 20–29)
CREAT SERPL-MCNC: 1.14 MG/DL (ref 0.57–1)
EGFR: 56 ML/MIN/1.73
EST. AVERAGE GLUCOSE BLD GHB EST-MCNC: 120 MG/DL
GLOBULIN SER CALC-MCNC: 3.1 G/DL (ref 1.5–4.5)
GLUCOSE SERPL-MCNC: 103 MG/DL (ref 65–99)
HBA1C MFR BLD: 5.8 % (ref 4.8–5.6)
HDLC SERPL-MCNC: 43 MG/DL
IMP & REVIEW OF LAB RESULTS: NORMAL
LDLC SERPL CALC-MCNC: 100 MG/DL (ref 0–99)
POTASSIUM SERPL-SCNC: 4.5 MMOL/L (ref 3.5–5.2)
PROT SERPL-MCNC: 7.5 G/DL (ref 6–8.5)
SODIUM SERPL-SCNC: 142 MMOL/L (ref 134–144)
TRIGL SERPL-MCNC: 102 MG/DL (ref 0–149)
VLDLC SERPL CALC-MCNC: 19 MG/DL (ref 5–40)

## 2022-03-09 PROBLEM — R73.03 PREDIABETES: Status: ACTIVE | Noted: 2021-12-10

## 2022-03-09 NOTE — PROGRESS NOTES
Chief Complaint   Patient presents with    Cholesterol Problem    Depression    Labs    Blood sugar problem     Assessment & Plan:     1. Prediabetes  Assessment & Plan:  Continue diet and exercise  Recheck A1c in 3 mos  Orders:  -     METABOLIC PANEL, COMPREHENSIVE; Future  -     HEMOGLOBIN A1C WITH EAG; Future  2. Hyperlipidemia LDL goal <100  Assessment & Plan:  LDL improved, now almost at goal, continue lifestyle modifications  Monitor annually  Orders:  -     METABOLIC PANEL, COMPREHENSIVE; Future  3. Elevated serum creatinine  Assessment & Plan:  Improving, continue hydration, avoid NSAIDs  Repeat labs in 3 mos  Orders:  -     METABOLIC PANEL, COMPREHENSIVE; Future  4. Mild depression (HCC)  Assessment & Plan:  Improved on regimen  Decrease Fluoxetine to 20 mg per patient request  Orders:  -     FLUoxetine (PROzac) 20 mg capsule; Take 1 Capsule by mouth daily. , Normal, Disp-90 Capsule, R-0  5. Chronic migraine without aura without status migrainosus, not intractable  Assessment & Plan:  Controlled on abortive therapy, continue  Orders:  -     SUMAtriptan (IMITREX) 50 mg tablet; Take 1 Tablet by mouth daily as needed for Migraine., Normal, Disp-6 Tablet, R-0  6. Screen for colon cancer  Comments:  Scheduled for May 2022    Follow-up and Dispositions    · Return in about 3 months (around 6/10/2022) for prediabetes, elevated serum creatinine (CMP, A1c). Subjective:     HPI    Hyperlipidemia  Treatment:  None  Comorbid:  Morbid obesity  Diet: eating more fruits and vegetables and smaller portions  Exercise:  started working out with cardio (30 min) and weights at [facility] [place]     Prediabetes  Risk factors: morbid obesity, HLD  Treatment:  None    Depression-  Patient is seen for followup of depression.    Ongoing symptoms include:  see PHQ-9  Current treatment:  Fluoxetine 40 mg daily but has been only taking it every other day, wanted to \"cut in half\" to improve focus  She states that since doing this her focus has improved  3 most recent [facility] Screens 3/10/2022 2021 2021   Little interest or pleasure in doing things Not at all Not at all Not at all   Feeling down, depressed, irritable, or hopeless Not at all Not at all Several days   Total Score PHQ 2 0 0 1   Trouble falling or staying asleep, or sleeping too much More than half the days - Several days   Feeling tired or having little energy Several days - Several days   Poor appetite, weight loss, or overeating Not at all - Nearly every day   Feeling bad about yourself - or that you are a failure or have let yourself or your family down Not at all - Not at all   Trouble concentrating on things such as school, work, reading, or watching TV Not at all - Not at all   Moving or speaking so slowly that other people could have noticed; or the opposite being so fidgety that others notice Not at all - Not at all   Thoughts of being better off dead, or hurting yourself in some way Not at all - Not at all   PHQ 9 Score 3 - 6   How difficult have these problems made it for you to do your work, take care of your home and get along with others Not difficult at all - -     Headaches/Migraines-  Onset: chronic  Location:  Right side  Duration: 3 days  Characteristics: starts off as sharp pain then turns into a dull ache  Frequency: less than 1 in a month  Associated symptoms: none  Aggravating factors: light  Relieving factors: rest  Treatment: sumatriptan 50 mg PRN  Pain Ratin/10 today    Health Maintenance:  Flu vac - already received  Shingles vac - recommended  Colonoscopy -  Scheduled at the end of May    Review of Systems   Constitutional: Negative for chills, fever and malaise/fatigue. Respiratory: Negative for shortness of breath. Cardiovascular: Negative for chest pain and palpitations. Gastrointestinal: Negative for nausea and vomiting. Neurological: Positive for headaches. Negative for dizziness and tingling.      Objective:   /68 (BP 1 Location: Left upper arm, BP Patient Position: Sitting, BP Cuff Size: Adult)   Pulse (!) 103   Temp 97.6 °F (36.4 °C) (Oral)   Resp 18   Ht 5' 5\" (1.651 m)   Wt 215 lb (97.5 kg)   SpO2 95%   BMI 35.78 kg/m²      Physical Exam  Vitals and nursing note reviewed. Constitutional:       General: She is not in acute distress. Appearance: She is not ill-appearing. HENT:      Head: Normocephalic and atraumatic. Cardiovascular:      Rate and Rhythm: Regular rhythm. Tachycardia present. Heart sounds: No murmur heard. No friction rub. No gallop. Pulmonary:      Effort: Pulmonary effort is normal. No respiratory distress. Breath sounds: No wheezing, rhonchi or rales. Skin:     General: Skin is warm and dry. Neurological:      General: No focal deficit present. Mental Status: She is alert and oriented to person, place, and time. Psychiatric:         Mood and Affect: Mood normal.         Thought Content:  Thought content normal.         Judgment: Judgment normal.            VAN Montalvo

## 2022-03-10 ENCOUNTER — OFFICE VISIT (OUTPATIENT)
Dept: FAMILY MEDICINE CLINIC | Age: 57
End: 2022-03-10
Payer: COMMERCIAL

## 2022-03-10 VITALS
SYSTOLIC BLOOD PRESSURE: 110 MMHG | HEART RATE: 103 BPM | DIASTOLIC BLOOD PRESSURE: 68 MMHG | HEIGHT: 65 IN | RESPIRATION RATE: 18 BRPM | TEMPERATURE: 97.6 F | BODY MASS INDEX: 35.82 KG/M2 | WEIGHT: 215 LBS | OXYGEN SATURATION: 95 %

## 2022-03-10 DIAGNOSIS — G43.709 CHRONIC MIGRAINE WITHOUT AURA WITHOUT STATUS MIGRAINOSUS, NOT INTRACTABLE: ICD-10-CM

## 2022-03-10 DIAGNOSIS — R73.03 PREDIABETES: Primary | ICD-10-CM

## 2022-03-10 DIAGNOSIS — F32.A MILD DEPRESSION: ICD-10-CM

## 2022-03-10 DIAGNOSIS — Z12.11 SCREEN FOR COLON CANCER: ICD-10-CM

## 2022-03-10 DIAGNOSIS — R79.89 ELEVATED SERUM CREATININE: ICD-10-CM

## 2022-03-10 DIAGNOSIS — E78.5 HYPERLIPIDEMIA LDL GOAL <100: ICD-10-CM

## 2022-03-10 PROCEDURE — 99214 OFFICE O/P EST MOD 30 MIN: CPT | Performed by: NURSE PRACTITIONER

## 2022-03-10 RX ORDER — SUMATRIPTAN 50 MG/1
50 TABLET, FILM COATED ORAL
Qty: 6 TABLET | Refills: 0 | Status: SHIPPED | OUTPATIENT
Start: 2022-03-10

## 2022-03-10 RX ORDER — FLUOXETINE HYDROCHLORIDE 20 MG/1
20 CAPSULE ORAL DAILY
Qty: 90 CAPSULE | Refills: 0 | Status: SHIPPED | OUTPATIENT
Start: 2022-03-10 | End: 2022-06-13

## 2022-03-10 NOTE — PROGRESS NOTES
Anderson Vanegas presents today for   Chief Complaint   Patient presents with    Cholesterol Problem    Depression    Labs    Blood sugar problem       Is someone accompanying this pt? no    Is the patient using any DME equipment during OV? no    Depression Screening:  3 most recent PHQ Screens 3/10/2022   Little interest or pleasure in doing things Not at all   Feeling down, depressed, irritable, or hopeless Not at all   Total Score PHQ 2 0   Trouble falling or staying asleep, or sleeping too much More than half the days   Feeling tired or having little energy Several days   Poor appetite, weight loss, or overeating Not at all   Feeling bad about yourself - or that you are a failure or have let yourself or your family down Not at all   Trouble concentrating on things such as school, work, reading, or watching TV Not at all   Moving or speaking so slowly that other people could have noticed; or the opposite being so fidgety that others notice Not at all   Thoughts of being better off dead, or hurting yourself in some way Not at all   PHQ 9 Score 3   How difficult have these problems made it for you to do your work, take care of your home and get along with others Not difficult at all       Learning Assessment:  Learning Assessment 1/10/2022   PRIMARY LEARNER Patient   HIGHEST LEVEL OF EDUCATION - PRIMARY LEARNER  4 YEARS [address] CAREGIVER No   PRIMARY LANGUAGE ENGLISH   LEARNER PREFERENCE PRIMARY READING   ANSWERED BY patient    RELATIONSHIP SELF       Fall Risk  No flowsheet data found. ADL  No flowsheet data found. Travel Screening:    Travel Screening     Question   Response    In the last month, have you been in contact with someone who was confirmed or suspected to have Coronavirus / COVID-19? No / Unsure    Have you had a COVID-19 viral test in the last 14 days? No    Do you have any of the following new or worsening symptoms?       Have you traveled internationally or domestically in the last month? No      Travel History   Travel since 02/10/22    No documented travel since 02/10/22         Health Maintenance reviewed and discussed and ordered per Provider. Health Maintenance Due   Topic Date Due    Colorectal Cancer Screening Combo  Never done    Shingrix Vaccine Age 50> (1 of 2) Never done    Flu Vaccine (1) Never done   . Coordination of Care:  1. Have you been to the ER, urgent care clinic since your last visit? Hospitalized since your last visit? no    2. Have you seen or consulted any other health care providers outside of the [address] [place] [address] since your last visit? Include any pap smears or colon screening.  no

## 2022-03-10 NOTE — PATIENT INSTRUCTIONS
Prediabetes: Care Instructions  Overview     Prediabetes is a warning sign that you're at risk for getting type 2 diabetes. It means that your blood sugar is higher than it should be. But it's not high enough to be diabetes. The food you eat naturally turns into sugar. Your body uses the sugar for energy. Normally, an organ called the pancreas makes insulin. And insulin allows the sugar in your blood to get into your body's cells. But sometimes the body can't use insulin the right way. So the sugar stays in your blood instead. This is called insulin resistance. The buildup of sugar in your blood means you have prediabetes. The good news is that you may be able to prevent or delay diabetes. Making small lifestyle changes, like getting active and changing your eating habits, may help you get your blood sugar back to normal. You can work with your doctor to make a treatment plan. Follow-up care is a key part of your treatment and safety. Be sure to make and go to all appointments, and call your doctor if you are having problems. It's also a good idea to know your test results and keep a list of the medicines you take. How can you care for yourself at home? · Watch your weight. A healthy weight helps your body use insulin properly. · Limit the amount of calories, sweets, and unhealthy fat you eat. Ask your doctor if you should see a dietitian. A registered dietitian can help you create meal plans that fit your lifestyle. · Get at least 30 minutes of exercise on most days of the week. Exercise helps control your blood sugar. It also helps you maintain a healthy weight. Walking is a good choice. You also may want to do other activities, such as running, swimming, cycling, or playing tennis or team sports. · Do not smoke. Smoking can make prediabetes worse. If you need help quitting, talk to your doctor about stop-smoking programs and medicines. These can increase your chances of quitting for good.   · If your doctor prescribed medicines, take them exactly as prescribed. Call your doctor if you think you are having a problem with your medicine. You will get more details on the specific medicines your doctor prescribes. When should you call for help? Watch closely for changes in your health, and be sure to contact your doctor if:    · You have any symptoms of diabetes. These may include:  ? Being thirsty more often. ? Urinating more. ? Being hungrier. ? Losing weight. ? Being very tired. ? Having blurry vision.     · You have a wound that will not heal.     · You have an infection that will not go away.     · You have problems with your blood pressure.     · You want more information about diabetes and how you can keep from getting it. Where can you learn more? Go to http://www.gray.com/  Enter I222 in the search box to learn more about \"Prediabetes: Care Instructions. \"  Current as of: July 28, 2021               Content Version: 13.2  © 2006-2022 [employer]. Care instructions adapted under license by [employer] (which disclaims liability or warranty for this information). If you have questions about a medical condition or this instruction, always ask your healthcare professional. [address] any warranty or liability for your use of this information.

## 2022-03-18 PROBLEM — E66.9 OBESITY (BMI 30-39.9): Status: ACTIVE | Noted: 2021-12-13

## 2022-03-19 PROBLEM — R73.03 PREDIABETES: Status: ACTIVE | Noted: 2021-12-10

## 2022-03-19 PROBLEM — E78.5 HYPERLIPIDEMIA LDL GOAL <100: Status: ACTIVE | Noted: 2021-12-10

## 2022-03-19 PROBLEM — Z86.16 HISTORY OF COVID-19: Status: ACTIVE | Noted: 2022-02-24

## 2022-03-19 PROBLEM — F32.A MILD DEPRESSION: Status: ACTIVE | Noted: 2021-11-04

## 2022-03-19 PROBLEM — R79.89 ELEVATED SERUM CREATININE: Status: ACTIVE | Noted: 2021-12-10

## 2022-03-19 PROBLEM — G43.709 CHRONIC MIGRAINE WITHOUT AURA WITHOUT STATUS MIGRAINOSUS, NOT INTRACTABLE: Status: ACTIVE | Noted: 2022-03-10

## 2022-05-31 ENCOUNTER — HOSPITAL ENCOUNTER (OUTPATIENT)
Dept: LAB | Age: 57
Discharge: HOME | End: 2022-05-31

## 2022-05-31 LAB — XX-LABCORP SPECIMEN COL,LCBCF: NORMAL

## 2022-05-31 PROCEDURE — 99001 SPECIMEN HANDLING PT-LAB: CPT

## 2022-06-01 LAB
ALBUMIN SERPL-MCNC: 4.4 G/DL (ref 3.8–4.9)
ALBUMIN/GLOB SERPL: 1.5 {RATIO} (ref 1.2–2.2)
ALP SERPL-CCNC: 65 IU/L (ref 44–121)
ALT SERPL-CCNC: 22 IU/L (ref 0–32)
AST SERPL-CCNC: 22 IU/L (ref 0–40)
BILIRUB SERPL-MCNC: 0.7 MG/DL (ref 0–1.2)
BUN SERPL-MCNC: 16 MG/DL (ref 6–24)
BUN/CREAT SERPL: 16 (ref 9–23)
CALCIUM SERPL-MCNC: 9.5 MG/DL (ref 8.7–10.2)
CHLORIDE SERPL-SCNC: 106 MMOL/L (ref 96–106)
CO2 SERPL-SCNC: 19 MMOL/L (ref 20–29)
CREAT SERPL-MCNC: 0.99 MG/DL (ref 0.57–1)
EGFR: 67 ML/MIN/1.73
EST. AVERAGE GLUCOSE BLD GHB EST-MCNC: 120 MG/DL
GLOBULIN SER CALC-MCNC: 2.9 G/DL (ref 1.5–4.5)
GLUCOSE SERPL-MCNC: 101 MG/DL (ref 65–99)
HBA1C MFR BLD: 5.8 % (ref 4.8–5.6)
POTASSIUM SERPL-SCNC: 4.1 MMOL/L (ref 3.5–5.2)
PROT SERPL-MCNC: 7.3 G/DL (ref 6–8.5)
SODIUM SERPL-SCNC: 139 MMOL/L (ref 134–144)

## 2022-06-08 NOTE — PROGRESS NOTES
Chief Complaint   Patient presents with    Cholesterol Problem    Depression    Labs     5/31/2022     Assessment & Plan:     1. Prediabetes  Assessment & Plan:  A1c stable, continue preventive measures  Recheck in 6 mos  Orders:  -     HEMOGLOBIN A1C WITH EAG; Future  -     METABOLIC PANEL, COMPREHENSIVE; Future  2. Elevated serum creatinine  Assessment & Plan:  Resolved   3. Hyperlipidemia LDL goal <100  Assessment & Plan:  Recheck lipid panel in 6 mos  Orders:  -     LIPID PANEL; Future  -     METABOLIC PANEL, COMPREHENSIVE; Future  4. Mild depression  Assessment & Plan:  Stable on regimen, continue  Orders:  -     CBC WITH AUTOMATED DIFF; Future  5. Obesity (BMI 30-39. 9)  Assessment & Plan:  Commended on weight loss, continue lifestyle modifications  6. Screen for colon cancer  Comments:  Done in May 2022, ROR form completed today  7. Encounter to discuss test results    Follow-up and Dispositions    · Return in about 6 months (around 12/10/2022) for  physical, cholesterol, prediabetes, depression, lab results (after 12/13). Subjective:     HPI    Prediabetes  Risk factors: morbid obesity, HLD  Treatment:  None  Has eliminated sugar and grains  Cardio for 30 min 2-3 times a week but plans to increase to 4-5 times a week  15 min of weight training  Has lost about 20 lbs since last appointment in March    Elevated Creatinine -  Risk factors:  None  NSAID use: None    Hyperlipidemia  Treatment:  None  Comorbid:  Morbid obesity    Depression-  Patient is seen for followup of depression.    Ongoing symptoms include:  see PHQ-9  Current treatment: Fluoxetine decreased to 20 mg in March  3 most recent [facility] Screens 6/10/2022 3/10/2022 12/13/2021   Little interest or pleasure in doing things Not at all Not at all Not at all   Feeling down, depressed, irritable, or hopeless Not at all Not at all Not at all   Total Score PHQ 2 0 0 0   Trouble falling or staying asleep, or sleeping too much Several days More than half the days -   Feeling tired or having little energy Not at all Several days -   Poor appetite, weight loss, or overeating Not at all Not at all -   Feeling bad about yourself - or that you are a failure or have let yourself or your family down Not at all Not at all -   Trouble concentrating on things such as school, work, reading, or watching TV Not at all Not at all -   Moving or speaking so slowly that other people could have noticed; or the opposite being so fidgety that others notice Not at all Not at all -   Thoughts of being better off dead, or hurting yourself in some way Not at all Not at all -   PHQ 9 Score 1 3 -   How difficult have these problems made it for you to do your work, take care of your home and get along with others - Not difficult at all -     Health Maintenance:  Shingles vac - has had both doses in [place]  Colonoscopy -  [place], was told repeat in 5 years, ROR completed today    Review of Systems   Constitutional: Negative for chills, fever and malaise/fatigue. Respiratory: Negative for shortness of breath. Cardiovascular: Negative for chest pain. Objective:   /79 (BP 1 Location: Left upper arm, BP Patient Position: Sitting, BP Cuff Size: Adult)   Pulse 97   Temp 98.1 °F (36.7 °C) (Oral)   Resp 17   Ht 5' 5\" (1.651 m)   Wt 202 lb (91.6 kg)   SpO2 97%   BMI 33.61 kg/m²      Physical Exam  Vitals and nursing note reviewed. Constitutional:       General: She is not in acute distress. Appearance: She is not ill-appearing. HENT:      Head: Normocephalic and atraumatic. Cardiovascular:      Rate and Rhythm: Normal rate and regular rhythm. Pulmonary:      Effort: Pulmonary effort is normal. No respiratory distress. Breath sounds: No wheezing, rhonchi or rales. Skin:     General: Skin is warm and dry. Neurological:      General: No focal deficit present. Mental Status: She is alert and oriented to person, place, and time.    Psychiatric: Mood and Affect: Mood normal.         Thought Content:  Thought content normal.         Judgment: Judgment normal.        Leni Cortes, CHUY-C

## 2022-06-10 ENCOUNTER — OFFICE VISIT (OUTPATIENT)
Dept: FAMILY MEDICINE CLINIC | Age: 57
End: 2022-06-10
Payer: COMMERCIAL

## 2022-06-10 VITALS
WEIGHT: 202 LBS | SYSTOLIC BLOOD PRESSURE: 112 MMHG | DIASTOLIC BLOOD PRESSURE: 79 MMHG | BODY MASS INDEX: 33.66 KG/M2 | HEIGHT: 65 IN | TEMPERATURE: 98.1 F | RESPIRATION RATE: 17 BRPM | HEART RATE: 97 BPM | OXYGEN SATURATION: 97 %

## 2022-06-10 DIAGNOSIS — R73.03 PREDIABETES: Primary | ICD-10-CM

## 2022-06-10 DIAGNOSIS — Z12.11 SCREEN FOR COLON CANCER: ICD-10-CM

## 2022-06-10 DIAGNOSIS — F32.A MILD DEPRESSION: ICD-10-CM

## 2022-06-10 DIAGNOSIS — Z71.2 ENCOUNTER TO DISCUSS TEST RESULTS: ICD-10-CM

## 2022-06-10 DIAGNOSIS — R79.89 ELEVATED SERUM CREATININE: ICD-10-CM

## 2022-06-10 DIAGNOSIS — E78.5 HYPERLIPIDEMIA LDL GOAL <100: ICD-10-CM

## 2022-06-10 DIAGNOSIS — E66.9 OBESITY (BMI 30-39.9): ICD-10-CM

## 2022-06-10 PROCEDURE — 99214 OFFICE O/P EST MOD 30 MIN: CPT | Performed by: NURSE PRACTITIONER

## 2022-06-10 NOTE — PROGRESS NOTES
Marisabel Guardado presents today for   Chief Complaint   Patient presents with    Cholesterol Problem    Depression    Labs     5/31/2022       Is someone accompanying this pt? no    Is the patient using any DME equipment during OV? no    Depression Screening:  3 most recent PHQ Screens 6/10/2022   Little interest or pleasure in doing things Not at all   Feeling down, depressed, irritable, or hopeless Not at all   Total Score PHQ 2 0   Trouble falling or staying asleep, or sleeping too much -   Feeling tired or having little energy -   Poor appetite, weight loss, or overeating -   Feeling bad about yourself - or that you are a failure or have let yourself or your family down -   Trouble concentrating on things such as school, work, reading, or watching TV -   Moving or speaking so slowly that other people could have noticed; or the opposite being so fidgety that others notice -   Thoughts of being better off dead, or hurting yourself in some way -   PHQ 9 Score -   How difficult have these problems made it for you to do your work, take care of your home and get along with others -       Learning Assessment:  Learning Assessment 1/10/2022   PRIMARY LEARNER Patient   HIGHEST LEVEL OF EDUCATION - PRIMARY LEARNER  4 YEARS OF COLLEGE   BARRIERS PRIMARY LEARNER NONE   CO-LEARNER CAREGIVER No   PRIMARY LANGUAGE ENGLISH   LEARNER PREFERENCE PRIMARY READING   ANSWERED BY patient    RELATIONSHIP SELF       Fall Risk  No flowsheet data found. ADL  No flowsheet data found. Travel Screening:    Travel Screening     Question   Response    In the last 10 days, have you been in contact with someone who was confirmed or suspected to have Coronavirus/COVID-19? No / Unsure    Have you had a COVID-19 viral test in the last 10 days? No    Do you have any of the following new or worsening symptoms? Have you traveled internationally or domestically in the last month?   No      Travel History   Travel since 05/10/22    No documented travel since 05/10/22         Health Maintenance reviewed and discussed and ordered per Provider. Health Maintenance Due   Topic Date Due    Colorectal Cancer Screening Combo  Never done    Shingrix Vaccine Age 50> (1 of 2) Never done   . Coordination of Care:  1. Have you been to the ER, urgent care clinic since your last visit? Hospitalized since your last visit? no    2. Have you seen or consulted any other health care providers outside of the [address] [place] [address] since your last visit? Include any pap smears or colon screening.  no

## 2022-06-13 DIAGNOSIS — F32.A MILD DEPRESSION: ICD-10-CM

## 2022-06-13 RX ORDER — FLUOXETINE HYDROCHLORIDE 20 MG/1
20 CAPSULE ORAL DAILY
Qty: 90 CAPSULE | Refills: 0 | Status: SHIPPED | OUTPATIENT
Start: 2022-06-13

## 2022-11-05 DIAGNOSIS — F32.A MILD DEPRESSION: ICD-10-CM

## 2022-11-06 RX ORDER — FLUOXETINE HYDROCHLORIDE 20 MG/1
20 CAPSULE ORAL DAILY
Qty: 90 CAPSULE | Refills: 0 | Status: SHIPPED | OUTPATIENT
Start: 2022-11-06

## 2022-12-02 ENCOUNTER — HOSPITAL ENCOUNTER (OUTPATIENT)
Dept: LAB | Age: 57
Discharge: HOME | End: 2022-12-02

## 2022-12-02 LAB — XX-LABCORP SPECIMEN COL,LCBCF: NORMAL

## 2022-12-02 PROCEDURE — 99001 SPECIMEN HANDLING PT-LAB: CPT

## 2022-12-15 PROBLEM — Z00.00 ANNUAL PHYSICAL EXAM: Status: ACTIVE | Noted: 2022-12-15

## 2022-12-15 NOTE — PROGRESS NOTES
Whit Cabrera is a 65 y.o. female is seen on 12/16/2022 for Cholesterol Problem, Depression, Physical, Labs, and Migraine    Assessment & Plan:     1. Annual physical exam  Assessment & Plan:  Physical activity for a total of 150 minutes per week is recommended, drink plenty of water. Reduce CHO intake, such as white pastas, white rice, white breads. Avoid fried foods, and eat more green, leafy vegetables, whole grains, and lean proteins. Discussed recommended screenings and vaccines  2. Hyperlipidemia LDL goal <100  Assessment & Plan:  LDL elevated  The 10-year ASCVD risk score (Gordo ALARCON, et al., 2019) is: 1.6%  Lifestyle modifications advised  Recheck lipid panel in 6 mos    Orders:  -     LIPID PANEL; Future  -     METABOLIC PANEL, COMPREHENSIVE; Future  3. Prediabetes  Assessment & Plan:  A1c now within normal limits, continue preventive measures  4. Depressive disorder in remission  Assessment & Plan: Tolerated weaning of Fluoxetine to 20 mg  Further wean dose down to 10 mg for four weeks then stop  Orders:  -     FLUoxetine (PROzac) 10 mg capsule; Take 1 Capsule by mouth daily. , Normal, Disp-90 Capsule, R-0  5. Chronic migraine without aura without status migrainosus, not intractable  Assessment & Plan:  Controlled on preventive measures, continue  6. Obesity (BMI 30-39. 9)  Assessment & Plan:  Continue diet and exercise  7. Encounter for screening mammogram for malignant neoplasm of breast  -     NEELAM MAMMO BI SCREENING INCL CAD; Future  8. Needs flu shot  Comments:  Already received at [facility]  9. Encounter to discuss test results     Follow-up and Dispositions    Return in about 6 months (around 6/16/2023) for cholesterol, depression, lab results (CMP, lipid).        Subjective:     HPI    Social Hx:  Occupation-   Household- lives with   ETOH use- seldom  Recreational drug use- none  Tobacco use- none     Family Hx:  HTN- none  Diabetes- none  HLD- none  MI- none  Stroke - none  Cancer- PGF (brain?), father (non-alcoholic fatty liver)  Mental Health Disorder- parents, grandparents (depression)  Autoimmune Disease- sister (RA?)    Preventive:  Diet: no sugar, no grains, instead has been eating eggs, beef, chicken, turkey, fish  [facility], [facility]  Last eye exam- June 2022  Last dental exam- pre-COVID  Last mammo- due now, ordered  COVID-19 vac - due for booster? Flu vaccine- already received at [facility]  Shingles vaccine- recommended    Additional Health Concerns Addressed Today:    Depression-  Patient is seen for followup of depression.    Ongoing symptoms include:  see PHQ-9  Current treatment: Fluoxetine 20 mg, was weaned down from 40 mg back in March States she continues to do well on current dose, would like to wean further down to 10 mg  3 most recent [facility] 12/16/2022 6/10/2022 3/10/2022   Little interest or pleasure in doing things Not at all Not at all Not at all   Feeling down, depressed, irritable, or hopeless Not at all Not at all Not at all   Total Score PHQ 2 0 0 0   Trouble falling or staying asleep, or sleeping too much Not at all Several days More than half the days   Feeling tired or having little energy Not at all Not at all Several days   Poor appetite, weight loss, or overeating Not at all Not at all Not at all   Feeling bad about yourself - or that you are a failure or have let yourself or your family down Not at all Not at all Not at all   Trouble concentrating on things such as school, work, reading, or watching TV Not at all Not at all Not at all   Moving or speaking so slowly that other people could have noticed; or the opposite being so fidgety that others notice Not at all Not at all Not at all   Thoughts of being better off dead, or hurting yourself in some way Not at all Not at all Not at all   PHQ 9 Score 0 1 3   How difficult have these problems made it for you to do your work, take care of your home and get along with others Not difficult at all - Not difficult at all       Review of Systems   Constitutional:  Negative for chills, fever, malaise/fatigue and weight loss. HENT:  Negative for congestion, ear pain and sore throat. Eyes:  Negative for blurred vision, pain and discharge. Respiratory:  Negative for cough and shortness of breath. Cardiovascular:  Negative for chest pain, palpitations, orthopnea and leg swelling. Gastrointestinal:  Negative for abdominal pain, blood in stool, diarrhea, nausea and vomiting. Genitourinary:  Negative for dysuria, frequency, hematuria and urgency. Musculoskeletal:  Negative for back pain, joint pain and myalgias. Skin:  Negative for rash. Neurological:  Negative for dizziness, tingling, weakness and headaches. Endo/Heme/Allergies:  Does not bruise/bleed easily. Psychiatric/Behavioral:  Negative for depression. The patient is not nervous/anxious and does not have insomnia. Objective:   /72 (BP 1 Location: Left upper arm, BP Patient Position: Sitting, BP Cuff Size: Adult)   Pulse 80   Temp 97.9 °F (36.6 °C) (Oral)   Resp 17   Ht 5' 5\" (1.651 m)   Wt 187 lb (84.8 kg)   SpO2 99%   BMI 31.12 kg/m²   No results found. Physical Exam  Vitals and nursing note reviewed. Constitutional:       General: She is not in acute distress. Appearance: Normal appearance. She is not ill-appearing or toxic-appearing. HENT:      Head: Normocephalic and atraumatic. Right Ear: Tympanic membrane, ear canal and external ear normal.      Left Ear: Tympanic membrane, ear canal and external ear normal.      Mouth/Throat:      Mouth: Mucous membranes are moist.      Pharynx: Oropharynx is clear. No oropharyngeal exudate or posterior oropharyngeal erythema. Eyes:      Extraocular Movements: Extraocular movements intact. Conjunctiva/sclera: Conjunctivae normal.      Pupils: Pupils are equal, round, and reactive to light.    Neck:      Vascular: No carotid bruit. Cardiovascular:      Rate and Rhythm: Normal rate and regular rhythm. Pulses: Normal pulses. Heart sounds: Normal heart sounds. No murmur heard. No friction rub. No gallop. Pulmonary:      Effort: Pulmonary effort is normal. No respiratory distress. Breath sounds: No wheezing, rhonchi or rales. Abdominal:      General: Abdomen is flat. Bowel sounds are normal. There is no distension. Palpations: Abdomen is soft. There is no mass. Tenderness: There is no abdominal tenderness. There is no right CVA tenderness, left CVA tenderness, guarding or rebound. Musculoskeletal:         General: No tenderness or deformity. Normal range of motion. Cervical back: Normal range of motion and neck supple. Lymphadenopathy:      Cervical: No cervical adenopathy. Skin:     General: Skin is warm and dry. Capillary Refill: Capillary refill takes less than 2 seconds. Neurological:      General: No focal deficit present. Mental Status: She is alert and oriented to person, place, and time. Sensory: No sensory deficit. Motor: No weakness. Coordination: Coordination normal.   Psychiatric:         Mood and Affect: Mood normal.         Thought Content:  Thought content normal.         Judgment: Judgment normal.          VAN Anderson

## 2022-12-15 NOTE — ASSESSMENT & PLAN NOTE
LDL elevated  The 10-year ASCVD risk score (Gordo ALARCON, et al., 2019) is: 1.6%  Lifestyle modifications advised  Recheck lipid panel in 6 mos

## 2022-12-16 ENCOUNTER — OFFICE VISIT (OUTPATIENT)
Dept: FAMILY MEDICINE CLINIC | Age: 57
End: 2022-12-16
Payer: COMMERCIAL

## 2022-12-16 VITALS
SYSTOLIC BLOOD PRESSURE: 102 MMHG | RESPIRATION RATE: 17 BRPM | WEIGHT: 187 LBS | TEMPERATURE: 97.9 F | OXYGEN SATURATION: 99 % | DIASTOLIC BLOOD PRESSURE: 72 MMHG | HEIGHT: 65 IN | HEART RATE: 80 BPM | BODY MASS INDEX: 31.16 KG/M2

## 2022-12-16 DIAGNOSIS — Z23 NEEDS FLU SHOT: ICD-10-CM

## 2022-12-16 DIAGNOSIS — F32.A DEPRESSIVE DISORDER IN REMISSION: ICD-10-CM

## 2022-12-16 DIAGNOSIS — Z12.31 ENCOUNTER FOR SCREENING MAMMOGRAM FOR MALIGNANT NEOPLASM OF BREAST: ICD-10-CM

## 2022-12-16 DIAGNOSIS — G43.709 CHRONIC MIGRAINE WITHOUT AURA WITHOUT STATUS MIGRAINOSUS, NOT INTRACTABLE: ICD-10-CM

## 2022-12-16 DIAGNOSIS — E66.9 OBESITY (BMI 30-39.9): ICD-10-CM

## 2022-12-16 DIAGNOSIS — Z00.00 ANNUAL PHYSICAL EXAM: Primary | ICD-10-CM

## 2022-12-16 DIAGNOSIS — Z71.2 ENCOUNTER TO DISCUSS TEST RESULTS: ICD-10-CM

## 2022-12-16 DIAGNOSIS — R73.03 PREDIABETES: ICD-10-CM

## 2022-12-16 DIAGNOSIS — E78.5 HYPERLIPIDEMIA LDL GOAL <100: ICD-10-CM

## 2022-12-16 PROCEDURE — 99396 PREV VISIT EST AGE 40-64: CPT | Performed by: NURSE PRACTITIONER

## 2022-12-16 PROCEDURE — 99213 OFFICE O/P EST LOW 20 MIN: CPT | Performed by: NURSE PRACTITIONER

## 2022-12-16 RX ORDER — FLUOXETINE 10 MG/1
10 CAPSULE ORAL DAILY
Qty: 90 CAPSULE | Refills: 0 | Status: SHIPPED | OUTPATIENT
Start: 2022-12-16

## 2022-12-16 NOTE — PROGRESS NOTES
Anderson Vanegas presents today for   Chief Complaint   Patient presents with    Cholesterol Problem    Depression    Physical    Labs    Migraine       Is someone accompanying this pt? no    Is the patient using any DME equipment during OV? no    Depression Screening:  3 most recent PHQ Screens 12/16/2022   Little interest or pleasure in doing things Not at all   Feeling down, depressed, irritable, or hopeless Not at all   Total Score PHQ 2 0   Trouble falling or staying asleep, or sleeping too much Not at all   Feeling tired or having little energy Not at all   Poor appetite, weight loss, or overeating Not at all   Feeling bad about yourself - or that you are a failure or have let yourself or your family down Not at all   Trouble concentrating on things such as school, work, reading, or watching TV Not at all   Moving or speaking so slowly that other people could have noticed; or the opposite being so fidgety that others notice Not at all   Thoughts of being better off dead, or hurting yourself in some way Not at all   PHQ 9 Score 0   How difficult have these problems made it for you to do your work, take care of your home and get along with others Not difficult at all       Learning Assessment:  Learning Assessment 1/10/2022   PRIMARY LEARNER Patient   HIGHEST LEVEL OF EDUCATION - PRIMARY LEARNER  4 YEARS [address] CAREGIVER No   PRIMARY LANGUAGE ENGLISH   LEARNER PREFERENCE PRIMARY READING   ANSWERED BY patient    RELATIONSHIP SELF       Fall Risk  No flowsheet data found. ADL  No flowsheet data found. Travel Screening:    Travel Screening       Question Response    In the last 10 days, have you been in contact with someone who was confirmed or suspected to have Coronavirus/COVID-19? No / Unsure    Have you had a COVID-19 viral test in the last 10 days? No    Do you have any of the following new or worsening symptoms?  None of these    Have you traveled internationally or domestically in the last month? No          Travel History   Travel since 11/16/22    No documented travel since 11/16/22         Health Maintenance reviewed and discussed and ordered per Provider. Health Maintenance Due   Topic Date Due    Shingrix Vaccine Age 49> (1 of 2) Never done    COVID-19 Vaccine (4 - Booster) 12/25/2021    Flu Vaccine (1) Never done    Breast Cancer Screen Mammogram  11/29/2022   . Coordination of Care:  1. Have you been to the ER, urgent care clinic since your last visit? Hospitalized since your last visit? no    2. Have you seen or consulted any other health care providers outside of the [address] [place] [address] since your last visit? Include any pap smears or colon screening.  no

## 2022-12-16 NOTE — PATIENT INSTRUCTIONS
Well Visit, Women 48 to 72: Care Instructions  Overview     Well visits can help you stay healthy. Your doctor has checked your overall health and may have suggested ways to take good care of yourself. Your doctor also may have recommended tests. At home, you can help prevent illness with healthy eating, regular exercise, and other steps. Follow-up care is a key part of your treatment and safety. Be sure to make and go to all appointments, and call your doctor if you are having problems. It's also a good idea to know your test results and keep a list of the medicines you take. How can you care for yourself at home? Get screening tests that you and your doctor decide on. Screening helps find diseases before any symptoms appear. Eat healthy foods. Choose fruits, vegetables, whole grains, protein, and low-fat dairy foods. Limit fat, especially saturated fat. Reduce salt in your diet. Limit alcohol. Have no more than 1 drink a day or 7 drinks a week. Get at least 30 minutes of exercise on most days of the week. Walking is a good choice. You also may want to do other activities, such as running, swimming, cycling, or playing tennis or team sports. Reach and stay at a healthy weight. This will lower your risk for many problems, such as obesity, diabetes, heart disease, and high blood pressure. Do not smoke. Smoking can make health problems worse. If you need help quitting, talk to your doctor about stop-smoking programs and medicines. These can increase your chances of quitting for good. Care for your mental health. It is easy to get weighed down by worry and stress. Learn strategies to manage stress, like deep breathing and mindfulness, and stay connected with your family and community. If you find you often feel sad or hopeless, talk with your doctor. Treatment can help. Talk to your doctor about whether you have any risk factors for sexually transmitted infections (STIs).  You can help prevent STIs if you wait to have sex with a new partner (or partners) until you've each been tested for STIs. It also helps if you use condoms (male or female condoms) and if you limit your sex partners to one person who only has sex with you. Vaccines are available for some STIs. If you think you may have a problem with alcohol or drug use, talk to your doctor. This includes prescription medicines (such as amphetamines and opioids) and illegal drugs (such as cocaine and methamphetamine). Your doctor can help you figure out what type of treatment is best for you. Protect your skin from too much sun. When you're outdoors from 10 a.m. to 4 p.m., stay in the shade or cover up with clothing and a hat with a wide brim. Wear sunglasses that block UV rays. Even when it's cloudy, put broad-spectrum sunscreen (SPF 30 or higher) on any exposed skin. See a dentist one or two times a year for checkups and to have your teeth cleaned. Wear a seat belt in the car. When should you call for help? Watch closely for changes in your health, and be sure to contact your doctor if you have any problems or symptoms that concern you. Where can you learn more? Go to http://www.gray.com/  Enter [id number] in the search box to learn more about \"Well Visit, Women 50 to 72: Care Instructions. \"  Current as of: June 6, 2022               Content Version: 13.4  © 0673-7595 [employer]wise, Incorporated. Care instructions adapted under license by [employer] (which disclaims liability or warranty for this information). If you have questions about a medical condition or this instruction, always ask your healthcare professional. [place] [address] any warranty or liability for your use of this information.

## 2023-01-14 PROBLEM — Z00.00 ANNUAL PHYSICAL EXAM: Status: RESOLVED | Noted: 2022-12-15 | Resolved: 2023-01-14

## 2023-01-28 ENCOUNTER — TRANSCRIBE ORDERS (OUTPATIENT)
Facility: HOSPITAL | Age: 58
End: 2023-01-28

## 2023-01-28 DIAGNOSIS — Z12.31 VISIT FOR SCREENING MAMMOGRAM: Primary | ICD-10-CM

## 2023-02-04 DIAGNOSIS — E78.5 HYPERLIPIDEMIA LDL GOAL <100: Primary | ICD-10-CM

## 2023-02-15 ENCOUNTER — HOSPITAL ENCOUNTER (OUTPATIENT)
Facility: HOSPITAL | Age: 58
Discharge: HOME | End: 2023-02-18
Payer: COMMERCIAL

## 2023-02-15 DIAGNOSIS — Z12.31 VISIT FOR SCREENING MAMMOGRAM: ICD-10-CM

## 2023-02-15 PROCEDURE — 77063 BREAST TOMOSYNTHESIS BI: CPT

## 2023-02-16 ENCOUNTER — TELEPHONE (OUTPATIENT)
Age: 58
End: 2023-02-16

## 2023-02-16 DIAGNOSIS — N64.89 BREAST ASYMMETRY IN FEMALE: Primary | ICD-10-CM

## 2023-02-16 NOTE — TELEPHONE ENCOUNTER
Attempted to reach patient regarding left breast asymmetry found on mammogram.  Left message for patient to return my call.   Will go ahead and order left breast US with diagnostic mammogram.

## 2023-02-17 PROBLEM — N64.89 BREAST ASYMMETRY IN FEMALE: Status: ACTIVE | Noted: 2023-02-17

## 2023-02-17 NOTE — TELEPHONE ENCOUNTER
Attempted to contact patient again, left second message. Will go ahead and send letter to patient's home address and release results via [employer].

## 2023-02-23 RX ORDER — FLUOXETINE 10 MG/1
CAPSULE ORAL
Qty: 90 CAPSULE | OUTPATIENT
Start: 2023-02-23

## 2023-03-14 ENCOUNTER — HOSPITAL ENCOUNTER (OUTPATIENT)
Facility: HOSPITAL | Age: 58
Discharge: HOME | End: 2023-03-17
Payer: COMMERCIAL

## 2023-03-14 DIAGNOSIS — N64.89 BREAST ASYMMETRY IN FEMALE: ICD-10-CM

## 2023-03-14 PROCEDURE — 77065 DX MAMMO INCL CAD UNI: CPT

## 2023-06-15 PROBLEM — R73.03 PREDIABETES: Status: RESOLVED | Noted: 2021-12-10 | Resolved: 2023-06-15

## 2023-11-26 DIAGNOSIS — F32.A DEPRESSIVE DISORDER IN REMISSION: ICD-10-CM

## 2023-11-27 RX ORDER — FLUOXETINE HCL 10 MG
10 CAPSULE ORAL DAILY
Qty: 90 CAPSULE | Refills: 1 | Status: SHIPPED | OUTPATIENT
Start: 2023-11-27

## 2023-11-27 NOTE — TELEPHONE ENCOUNTER
Last Filled - 6/16/23    Last Appt -6/16/23    Next Appt - 12/15/23    Labs - 6/12/23      Additional Notes:

## 2023-12-11 ENCOUNTER — HOSPITAL ENCOUNTER (OUTPATIENT)
Facility: HOSPITAL | Age: 58
Discharge: HOME | End: 2023-12-14
Payer: COMMERCIAL

## 2023-12-11 LAB
ALBUMIN SERPL-MCNC: 3.7 G/DL (ref 3.4–5)
ALBUMIN/GLOB SERPL: 1.1 (ref 0.8–1.7)
ALP SERPL-CCNC: 69 U/L (ref 45–117)
ALT SERPL-CCNC: 24 U/L (ref 13–56)
ANION GAP SERPL CALC-SCNC: 3 MMOL/L (ref 3–18)
AST SERPL-CCNC: 19 U/L (ref 10–38)
BASOPHILS # BLD: 0 K/UL (ref 0–0.1)
BASOPHILS NFR BLD: 0 % (ref 0–2)
BILIRUB SERPL-MCNC: 0.5 MG/DL (ref 0.2–1)
BUN SERPL-MCNC: 13 MG/DL (ref 7–18)
BUN/CREAT SERPL: 12 (ref 12–20)
CALCIUM SERPL-MCNC: 9.2 MG/DL (ref 8.5–10.1)
CHLORIDE SERPL-SCNC: 106 MMOL/L (ref 100–111)
CHOLEST SERPL-MCNC: 158 MG/DL
CO2 SERPL-SCNC: 30 MMOL/L (ref 21–32)
CREAT SERPL-MCNC: 1.06 MG/DL (ref 0.6–1.3)
DIFFERENTIAL METHOD BLD: NORMAL
EOSINOPHIL # BLD: 0.1 K/UL (ref 0–0.4)
EOSINOPHIL NFR BLD: 1 % (ref 0–5)
ERYTHROCYTE [DISTWIDTH] IN BLOOD BY AUTOMATED COUNT: 12.6 % (ref 11.6–14.5)
GLOBULIN SER CALC-MCNC: 3.3 G/DL (ref 2–4)
GLUCOSE SERPL-MCNC: 99 MG/DL (ref 74–99)
HCT VFR BLD AUTO: 44.1 % (ref 35–45)
HDLC SERPL-MCNC: 51 MG/DL (ref 40–60)
HDLC SERPL: 3.1 (ref 0–5)
HGB BLD-MCNC: 14.5 G/DL (ref 12–16)
IMM GRANULOCYTES # BLD AUTO: 0 K/UL (ref 0–0.04)
IMM GRANULOCYTES NFR BLD AUTO: 0 % (ref 0–0.5)
LDLC SERPL CALC-MCNC: 93.8 MG/DL (ref 0–100)
LIPID PANEL: NORMAL
LYMPHOCYTES # BLD: 1.9 K/UL (ref 0.9–3.6)
LYMPHOCYTES NFR BLD: 21 % (ref 21–52)
MCH RBC QN AUTO: 30.6 PG (ref 24–34)
MCHC RBC AUTO-ENTMCNC: 32.9 G/DL (ref 31–37)
MCV RBC AUTO: 93 FL (ref 78–100)
MONOCYTES # BLD: 0.6 K/UL (ref 0.05–1.2)
MONOCYTES NFR BLD: 7 % (ref 3–10)
NEUTS SEG # BLD: 6.3 K/UL (ref 1.8–8)
NEUTS SEG NFR BLD: 70 % (ref 40–73)
NRBC # BLD: 0 K/UL (ref 0–0.01)
NRBC BLD-RTO: 0 PER 100 WBC
PLATELET # BLD AUTO: 206 K/UL (ref 135–420)
PMV BLD AUTO: 9.5 FL (ref 9.2–11.8)
POTASSIUM SERPL-SCNC: 4.7 MMOL/L (ref 3.5–5.5)
PROT SERPL-MCNC: 7 G/DL (ref 6.4–8.2)
RBC # BLD AUTO: 4.74 M/UL (ref 4.2–5.3)
SODIUM SERPL-SCNC: 139 MMOL/L (ref 136–145)
TRIGL SERPL-MCNC: 66 MG/DL
VLDLC SERPL CALC-MCNC: 13.2 MG/DL
WBC # BLD AUTO: 9 K/UL (ref 4.6–13.2)

## 2023-12-11 PROCEDURE — 80061 LIPID PANEL: CPT

## 2023-12-11 PROCEDURE — 85025 COMPLETE CBC W/AUTO DIFF WBC: CPT

## 2023-12-11 PROCEDURE — 36415 COLL VENOUS BLD VENIPUNCTURE: CPT

## 2023-12-11 PROCEDURE — 80053 COMPREHEN METABOLIC PANEL: CPT

## 2023-12-13 NOTE — ASSESSMENT & PLAN NOTE
Excellent drop in LDL  The 10-year ASCVD risk score (Yudelka NEWBERRY, et al., 2019) is: 1.7%  Continue risk factor modifications  Monitor annually

## 2023-12-13 NOTE — PROGRESS NOTES
Vicki Fontanez is a 63 y.o. female is seen on 12/15/2023 for Annual Exam, Cholesterol Problem, Depression, Obesity, Discuss Labs, and Nasal Congestion (Pt reports having nasal pressure and congestion for the past 3 weeks. Pt reports sore throat x 3 weeks, cough w/o production that occurs mostly in the morning. Pt denies any body aches, N/V, diarrhea.)    Assessment & Plan:     1. Annual physical exam  Assessment & Plan:  Physical activity for a total of 150 minutes per week is recommended, drink plenty of water. Reduce CHO intake, such as white pastas, white rice, white breads. Avoid fried foods, and eat more green, leafy vegetables, whole grains, and lean proteins. Discussed recommended screenings and vaccines  2. Obesity (BMI 30-39. 9)  Assessment & Plan:  Lifestyle modifications advised  3. Hyperlipidemia LDL goal <100  Assessment & Plan:  Excellent drop in LDL  The 10-year ASCVD risk score (Yudelka DK, et al., 2019) is: 1.7%  Continue risk factor modifications  Monitor annually  4. Depressive disorder in remission  Assessment & Plan:  Stable on Fluoxetine 10 mg, continue  5. Acute non-recurrent pansinusitis  Comments:  Persists, start abx, take with food  Flonase, Prednisone for added sx relief  ENT if no improvement with above  Orders:  -     amoxicillin-clavulanate (AUGMENTIN) 875-125 MG per tablet; Take 1 tablet by mouth 2 times daily for 10 days, Disp-20 tablet, R-0Normal  -     fluticasone (FLONASE) 50 MCG/ACT nasal spray; 1 spray by Each Nostril route daily, Disp-32 g, R-1Normal  -     predniSONE (DELTASONE) 20 MG tablet; Take 2 tablets by mouth daily for 5 days, Disp-10 tablet, R-0Normal  6. Encounter for screening mammogram for malignant neoplasm of breast  Comments:  Due in March 2024, declined future order for now d/t possible insurance change in the new year  7.  Encounter to discuss test results  Comments:  Fasting labs reviewed in detail with patient     Follow-up and Dispositions    Return in about

## 2023-12-15 ENCOUNTER — OFFICE VISIT (OUTPATIENT)
Facility: CLINIC | Age: 58
End: 2023-12-15
Payer: COMMERCIAL

## 2023-12-15 VITALS
DIASTOLIC BLOOD PRESSURE: 69 MMHG | OXYGEN SATURATION: 98 % | RESPIRATION RATE: 16 BRPM | TEMPERATURE: 98.5 F | BODY MASS INDEX: 31.95 KG/M2 | SYSTOLIC BLOOD PRESSURE: 101 MMHG | HEART RATE: 87 BPM | WEIGHT: 192 LBS

## 2023-12-15 DIAGNOSIS — Z12.31 ENCOUNTER FOR SCREENING MAMMOGRAM FOR MALIGNANT NEOPLASM OF BREAST: ICD-10-CM

## 2023-12-15 DIAGNOSIS — F32.A DEPRESSIVE DISORDER IN REMISSION: ICD-10-CM

## 2023-12-15 DIAGNOSIS — J01.40 ACUTE NON-RECURRENT PANSINUSITIS: ICD-10-CM

## 2023-12-15 DIAGNOSIS — Z00.00 ANNUAL PHYSICAL EXAM: Primary | ICD-10-CM

## 2023-12-15 DIAGNOSIS — Z71.2 ENCOUNTER TO DISCUSS TEST RESULTS: ICD-10-CM

## 2023-12-15 DIAGNOSIS — E66.9 OBESITY (BMI 30-39.9): ICD-10-CM

## 2023-12-15 DIAGNOSIS — E78.5 HYPERLIPIDEMIA LDL GOAL <100: ICD-10-CM

## 2023-12-15 PROBLEM — R79.89 ELEVATED SERUM CREATININE: Status: RESOLVED | Noted: 2021-12-10 | Resolved: 2023-12-15

## 2023-12-15 PROCEDURE — 3017F COLORECTAL CA SCREEN DOC REV: CPT | Performed by: NURSE PRACTITIONER

## 2023-12-15 PROCEDURE — 1036F TOBACCO NON-USER: CPT | Performed by: NURSE PRACTITIONER

## 2023-12-15 PROCEDURE — 99215 OFFICE O/P EST HI 40 MIN: CPT | Performed by: NURSE PRACTITIONER

## 2023-12-15 PROCEDURE — G8417 CALC BMI ABV UP PARAM F/U: HCPCS | Performed by: NURSE PRACTITIONER

## 2023-12-15 PROCEDURE — G8427 DOCREV CUR MEDS BY ELIG CLIN: HCPCS | Performed by: NURSE PRACTITIONER

## 2023-12-15 PROCEDURE — G8484 FLU IMMUNIZE NO ADMIN: HCPCS | Performed by: NURSE PRACTITIONER

## 2023-12-15 PROCEDURE — 99396 PREV VISIT EST AGE 40-64: CPT | Performed by: NURSE PRACTITIONER

## 2023-12-15 RX ORDER — PREDNISONE 20 MG/1
40 TABLET ORAL DAILY
Qty: 10 TABLET | Refills: 0 | Status: SHIPPED | OUTPATIENT
Start: 2023-12-15 | End: 2023-12-20

## 2023-12-15 RX ORDER — FLUTICASONE PROPIONATE 50 MCG
1 SPRAY, SUSPENSION (ML) NASAL DAILY
Qty: 32 G | Refills: 1 | Status: SHIPPED | OUTPATIENT
Start: 2023-12-15

## 2023-12-15 RX ORDER — ACETAMINOPHEN 325 MG/1
650 TABLET ORAL EVERY 6 HOURS PRN
COMMUNITY

## 2023-12-15 RX ORDER — AMOXICILLIN AND CLAVULANATE POTASSIUM 875; 125 MG/1; MG/1
1 TABLET, FILM COATED ORAL 2 TIMES DAILY
Qty: 20 TABLET | Refills: 0 | Status: SHIPPED | OUTPATIENT
Start: 2023-12-15 | End: 2023-12-25

## 2023-12-15 ASSESSMENT — PATIENT HEALTH QUESTIONNAIRE - PHQ9
SUM OF ALL RESPONSES TO PHQ9 QUESTIONS 1 & 2: 0
1. LITTLE INTEREST OR PLEASURE IN DOING THINGS: 0
8. MOVING OR SPEAKING SO SLOWLY THAT OTHER PEOPLE COULD HAVE NOTICED. OR THE OPPOSITE, BEING SO FIGETY OR RESTLESS THAT YOU HAVE BEEN MOVING AROUND A LOT MORE THAN USUAL: 1
5. POOR APPETITE OR OVEREATING: 1
7. TROUBLE CONCENTRATING ON THINGS, SUCH AS READING THE NEWSPAPER OR WATCHING TELEVISION: 0
6. FEELING BAD ABOUT YOURSELF - OR THAT YOU ARE A FAILURE OR HAVE LET YOURSELF OR YOUR FAMILY DOWN: 0
2. FEELING DOWN, DEPRESSED OR HOPELESS: 0
SUM OF ALL RESPONSES TO PHQ QUESTIONS 1-9: 2
SUM OF ALL RESPONSES TO PHQ QUESTIONS 1-9: 2
3. TROUBLE FALLING OR STAYING ASLEEP: 0
9. THOUGHTS THAT YOU WOULD BE BETTER OFF DEAD, OR OF HURTING YOURSELF: 0
SUM OF ALL RESPONSES TO PHQ QUESTIONS 1-9: 2
4. FEELING TIRED OR HAVING LITTLE ENERGY: 0
SUM OF ALL RESPONSES TO PHQ QUESTIONS 1-9: 2
10. IF YOU CHECKED OFF ANY PROBLEMS, HOW DIFFICULT HAVE THESE PROBLEMS MADE IT FOR YOU TO DO YOUR WORK, TAKE CARE OF THINGS AT HOME, OR GET ALONG WITH OTHER PEOPLE: 0

## 2023-12-15 NOTE — PROGRESS NOTES
Makenzie Morales presents today for   Chief Complaint   Patient presents with    Annual Exam    Cholesterol Problem    Depression    Obesity    Discuss Labs       Is someone accompanying this pt? no    Is the patient using any DME equipment during OV? no    Depression Screenin/15/2023     8:40 AM 2023     8:26 AM 2022     8:00 AM 6/10/2022     8:26 AM   PHQ-9 Questionaire   Little interest or pleasure in doing things 0 0 0 0   Feeling down, depressed, or hopeless 0 0 0 0   Trouble falling or staying asleep, or sleeping too much 0 0 0 1   Feeling tired or having little energy 0 0 0 0   Poor appetite or overeating 1 0 0 0   Feeling bad about yourself - or that you are a failure or have let yourself or your family down 0 0 0 0   Trouble concentrating on things, such as reading the newspaper or watching television 0 0 0 0   Moving or speaking so slowly that other people could have noticed. Or the opposite - being so fidgety or restless that you have been moving around a lot more than usual 1 0 0 0   Thoughts that you would be better off dead, or of hurting yourself in some way 0 0     PHQ-9 Total Score 2 0 0 1   If you checked off any problems, how difficult have these problems made it for you to do your work, take care of things at home, or get along with other people? 0 0          ELIZABETH 7-Anxiety        No data to display                 Learning Assessment:  No question data found. Fall Risk       No data to display                   Travel Screening:    Travel Screening     No screening recorded since 23 0000       Travel History   Travel since 11/15/23    No documented travel since 11/15/23          Health Maintenance reviewed and discussed and ordered per Provider.   Social Determinants of Health     Tobacco Use: Low Risk  (2023)    Patient History     Smoking Tobacco Use: Never     Smokeless Tobacco Use: Never     Passive Exposure: Not on file   Alcohol Use: Not on file